# Patient Record
Sex: FEMALE | Race: WHITE | NOT HISPANIC OR LATINO | Employment: OTHER | URBAN - METROPOLITAN AREA
[De-identification: names, ages, dates, MRNs, and addresses within clinical notes are randomized per-mention and may not be internally consistent; named-entity substitution may affect disease eponyms.]

---

## 2017-03-25 ENCOUNTER — HOSPITAL ENCOUNTER (OUTPATIENT)
Facility: MEDICAL CENTER | Age: 62
End: 2017-03-25
Attending: NURSE PRACTITIONER
Payer: OTHER GOVERNMENT

## 2017-03-25 ENCOUNTER — OFFICE VISIT (OUTPATIENT)
Dept: URGENT CARE | Facility: PHYSICIAN GROUP | Age: 62
End: 2017-03-25
Payer: OTHER GOVERNMENT

## 2017-03-25 VITALS
TEMPERATURE: 97.5 F | SYSTOLIC BLOOD PRESSURE: 132 MMHG | RESPIRATION RATE: 16 BRPM | HEIGHT: 65 IN | OXYGEN SATURATION: 96 % | DIASTOLIC BLOOD PRESSURE: 80 MMHG | BODY MASS INDEX: 28.32 KG/M2 | HEART RATE: 96 BPM | WEIGHT: 170 LBS

## 2017-03-25 DIAGNOSIS — R30.0 DYSURIA: ICD-10-CM

## 2017-03-25 LAB
APPEARANCE UR: CLEAR
BILIRUB UR STRIP-MCNC: NORMAL MG/DL
COLOR UR AUTO: YELLOW
GLUCOSE UR STRIP.AUTO-MCNC: NORMAL MG/DL
KETONES UR STRIP.AUTO-MCNC: NORMAL MG/DL
LEUKOCYTE ESTERASE UR QL STRIP.AUTO: NORMAL
NITRITE UR QL STRIP.AUTO: NORMAL
PH UR STRIP.AUTO: 6 [PH] (ref 5–8)
PROT UR QL STRIP: NORMAL MG/DL
RBC UR QL AUTO: NORMAL
SP GR UR STRIP.AUTO: 1
UROBILINOGEN UR STRIP-MCNC: NORMAL MG/DL

## 2017-03-25 PROCEDURE — 81002 URINALYSIS NONAUTO W/O SCOPE: CPT | Performed by: NURSE PRACTITIONER

## 2017-03-25 PROCEDURE — 87086 URINE CULTURE/COLONY COUNT: CPT

## 2017-03-25 PROCEDURE — 87077 CULTURE AEROBIC IDENTIFY: CPT

## 2017-03-25 PROCEDURE — 99203 OFFICE O/P NEW LOW 30 MIN: CPT | Performed by: NURSE PRACTITIONER

## 2017-03-25 PROCEDURE — 87186 SC STD MICRODIL/AGAR DIL: CPT

## 2017-03-25 RX ORDER — MECOBALAMIN 5000 MCG
1 TABLET,DISINTEGRATING ORAL EVERY MORNING
COMMUNITY

## 2017-03-25 RX ORDER — PHENAZOPYRIDINE HYDROCHLORIDE 100 MG/1
200 TABLET, FILM COATED ORAL 3 TIMES DAILY PRN
Qty: 6 TAB | Refills: 0 | Status: SHIPPED | OUTPATIENT
Start: 2017-03-25 | End: 2017-10-25

## 2017-03-25 ASSESSMENT — LIFESTYLE VARIABLES: SUBSTANCE_ABUSE: 0

## 2017-03-25 ASSESSMENT — ENCOUNTER SYMPTOMS
NAUSEA: 0
DIZZINESS: 0
BACK PAIN: 0
CHILLS: 0
FLANK PAIN: 0
FEVER: 0
ABDOMINAL PAIN: 0

## 2017-03-25 NOTE — PROGRESS NOTES
"Subjective:      Hilda Emanuel is a 61 y.o. female who presents with Dysuria  .  [facility] reviewed and updated as necessary in EPIC electronic record with patient today.  Medications including OTC medications reviewed with patient.         Allergies   Allergen Reactions   • Septra [Bactrim] Rash   • Sulfa Drugs Rash     NAUSEA AND VOMITING             HPI march 8 was put on Macrodantin for UTI. Symptoms completely improved after macrodantan. Yesterday she also had burning and urgency with urination and 'did not feel good\" this morning. Now here at urgent care not having all the symptoms of burning or urgency.  She has history of UTI with sepsis ( approx 2013 at [facility]). Denies history of pyelonephritis.     Review of Systems   Constitutional: Negative for fever and chills.   Gastrointestinal: Negative for nausea and abdominal pain.   Genitourinary: Positive for urgency and frequency. Negative for dysuria, hematuria and flank pain.   Musculoskeletal: Negative for back pain.   Neurological: Negative for dizziness.   Endo/Heme/Allergies: Negative for environmental allergies.   Psychiatric/Behavioral: Negative for substance abuse.          Objective:     /80 mmHg  Pulse 96  Temp(Src) 36.4 °C (97.5 °F)  Resp 16  Ht 1.651 m (5' 5\")  Wt 77.111 kg (170 lb)  BMI 28.29 kg/m2  SpO2 96%     Physical Exam   Constitutional: She is oriented to person, place, and time. She appears well-developed and well-nourished.   HENT:   Head: Normocephalic.   Neck: Normal range of motion.   Cardiovascular: Normal rate.    Pulmonary/Chest: Effort normal.   Abdominal: Soft. Normal appearance. There is no tenderness. There is no rigidity, no rebound, no guarding and no CVA tenderness.   Neurological: She is alert and oriented to person, place, and time.   Skin: Skin is warm and dry.   Nursing note and vitals reviewed.    UA : trace leuk, trace blood.otherwise negative - see results in chart          Assessment/Plan:     1. " Dysuria  POCT Urinalysis    phenazopyridine (PYRIDIUM) 100 MG Tab    URINE CULTURE(NEW)   Differential Diagnosis includes but is not limited to: cystitis - interstitial, UTI    Educated in proper administration of medication(s) ordered today including safety, possible SE, risks, benefits, rationale and alternatives to therapy.   Return to clinic or PCP prn  if current symptoms are not resolving in a satisfactory manner or sooner if new or worsening symptoms occur.   Patient and or family advised differential diagnoses, signs and symptoms which would warrant Emergency Department evaluation.  Verbalizes understanding of instructions.   Pt education done. Aftercare instructions given to pt/ caregiver. Questions answered. Verbalizes good understanding.   Keep well hydrated      [phone] okay to leave message- they will be traveling in [place] and [place] area for several weeks.

## 2017-03-27 LAB
BACTERIA UR CULT: ABNORMAL
BACTERIA UR CULT: ABNORMAL
SIGNIFICANT IND 70042: ABNORMAL
SOURCE SOURCE: ABNORMAL

## 2017-03-30 ENCOUNTER — TELEPHONE (OUTPATIENT)
Dept: URGENT CARE | Facility: CLINIC | Age: 62
End: 2017-03-30

## 2017-03-30 DIAGNOSIS — N30.00 ACUTE CYSTITIS WITHOUT HEMATURIA: ICD-10-CM

## 2017-03-30 RX ORDER — PHENAZOPYRIDINE HYDROCHLORIDE 200 MG/1
200 TABLET, FILM COATED ORAL 3 TIMES DAILY PRN
Qty: 6 TAB | Refills: 0 | Status: SHIPPED | OUTPATIENT
Start: 2017-03-30 | End: 2017-10-25

## 2017-03-30 RX ORDER — CIPROFLOXACIN 500 MG/1
500 TABLET, FILM COATED ORAL 2 TIMES DAILY
Qty: 10 TAB | Refills: 0 | Status: SHIPPED | OUTPATIENT
Start: 2017-03-30 | End: 2017-04-04

## 2017-10-25 DIAGNOSIS — Z01.812 PRE-OPERATIVE LABORATORY EXAMINATION: ICD-10-CM

## 2017-10-25 LAB
ANION GAP SERPL CALC-SCNC: 10 MMOL/L (ref 0–11.9)
BASOPHILS # BLD AUTO: 0.5 % (ref 0–1.8)
BASOPHILS # BLD: 0.05 K/UL (ref 0–0.12)
BUN SERPL-MCNC: 27 MG/DL (ref 8–22)
CALCIUM SERPL-MCNC: 10.4 MG/DL (ref 8.5–10.5)
CHLORIDE SERPL-SCNC: 105 MMOL/L (ref 96–112)
CO2 SERPL-SCNC: 23 MMOL/L (ref 20–33)
CREAT SERPL-MCNC: 1.2 MG/DL (ref 0.5–1.4)
EOSINOPHIL # BLD AUTO: 0.14 K/UL (ref 0–0.51)
EOSINOPHIL NFR BLD: 1.5 % (ref 0–6.9)
ERYTHROCYTE [DISTWIDTH] IN BLOOD BY AUTOMATED COUNT: 43.9 FL (ref 35.9–50)
GFR SERPL CREATININE-BSD FRML MDRD: 45 ML/MIN/1.73 M 2
GLUCOSE SERPL-MCNC: 86 MG/DL (ref 65–99)
HCT VFR BLD AUTO: 42.4 % (ref 37–47)
HGB BLD-MCNC: 13.9 G/DL (ref 12–16)
IMM GRANULOCYTES # BLD AUTO: 0.03 K/UL (ref 0–0.11)
IMM GRANULOCYTES NFR BLD AUTO: 0.3 % (ref 0–0.9)
LYMPHOCYTES # BLD AUTO: 2.95 K/UL (ref 1–4.8)
LYMPHOCYTES NFR BLD: 31.3 % (ref 22–41)
MCH RBC QN AUTO: 29.4 PG (ref 27–33)
MCHC RBC AUTO-ENTMCNC: 32.8 G/DL (ref 33.6–35)
MCV RBC AUTO: 89.8 FL (ref 81.4–97.8)
MONOCYTES # BLD AUTO: 0.72 K/UL (ref 0–0.85)
MONOCYTES NFR BLD AUTO: 7.7 % (ref 0–13.4)
NEUTROPHILS # BLD AUTO: 5.52 K/UL (ref 2–7.15)
NEUTROPHILS NFR BLD: 58.7 % (ref 44–72)
NRBC # BLD AUTO: 0 K/UL
NRBC BLD AUTO-RTO: 0 /100 WBC
PLATELET # BLD AUTO: 194 K/UL (ref 164–446)
PMV BLD AUTO: 11.5 FL (ref 9–12.9)
POTASSIUM SERPL-SCNC: 4.4 MMOL/L (ref 3.6–5.5)
RBC # BLD AUTO: 4.72 M/UL (ref 4.2–5.4)
SODIUM SERPL-SCNC: 138 MMOL/L (ref 135–145)
WBC # BLD AUTO: 9.4 K/UL (ref 4.8–10.8)

## 2017-10-25 PROCEDURE — 85025 COMPLETE CBC W/AUTO DIFF WBC: CPT

## 2017-10-25 PROCEDURE — 80048 BASIC METABOLIC PNL TOTAL CA: CPT

## 2017-10-25 PROCEDURE — 36415 COLL VENOUS BLD VENIPUNCTURE: CPT

## 2017-10-25 RX ORDER — FLUTICASONE PROPIONATE 50 MCG
1 SPRAY, SUSPENSION (ML) NASAL DAILY
COMMUNITY
End: 2021-01-26

## 2017-10-25 RX ORDER — LEVOTHYROXINE SODIUM 0.12 MG/1
125 TABLET ORAL
COMMUNITY

## 2017-11-01 ENCOUNTER — HOSPITAL ENCOUNTER (OUTPATIENT)
Facility: MEDICAL CENTER | Age: 62
End: 2017-11-01
Attending: OPHTHALMOLOGY | Admitting: OPHTHALMOLOGY
Payer: OTHER GOVERNMENT

## 2017-11-01 VITALS
BODY MASS INDEX: 35.1 KG/M2 | RESPIRATION RATE: 16 BRPM | HEIGHT: 60 IN | SYSTOLIC BLOOD PRESSURE: 146 MMHG | HEART RATE: 84 BPM | WEIGHT: 178.79 LBS | TEMPERATURE: 96.7 F | DIASTOLIC BLOOD PRESSURE: 86 MMHG | OXYGEN SATURATION: 94 %

## 2017-11-01 PROBLEM — H04.222 EPIPHORA DUE TO INSUFFICIENT DRAINAGE OF LEFT SIDE: Status: ACTIVE | Noted: 2017-11-01

## 2017-11-01 PROCEDURE — 160035 HCHG PACU - 1ST 60 MINS PHASE I: Performed by: OPHTHALMOLOGY

## 2017-11-01 PROCEDURE — 160048 HCHG OR STATISTICAL LEVEL 1-5: Performed by: OPHTHALMOLOGY

## 2017-11-01 PROCEDURE — 501423 HCHG SPONGE, SURGIFOAM 12X7: Performed by: OPHTHALMOLOGY

## 2017-11-01 PROCEDURE — A9270 NON-COVERED ITEM OR SERVICE: HCPCS

## 2017-11-01 PROCEDURE — 700102 HCHG RX REV CODE 250 W/ 637 OVERRIDE(OP)

## 2017-11-01 PROCEDURE — 700101 HCHG RX REV CODE 250

## 2017-11-01 PROCEDURE — 500558 HCHG EYE SHIELD W/GARTER (FOX): Performed by: OPHTHALMOLOGY

## 2017-11-01 PROCEDURE — 160002 HCHG RECOVERY MINUTES (STAT): Performed by: OPHTHALMOLOGY

## 2017-11-01 PROCEDURE — 501326 HCHG SET, CANALIARLUS INTUBATION-EYE: Performed by: OPHTHALMOLOGY

## 2017-11-01 PROCEDURE — 700111 HCHG RX REV CODE 636 W/ 250 OVERRIDE (IP)

## 2017-11-01 PROCEDURE — 160009 HCHG ANES TIME/MIN: Performed by: OPHTHALMOLOGY

## 2017-11-01 PROCEDURE — 160036 HCHG PACU - EA ADDL 30 MINS PHASE I: Performed by: OPHTHALMOLOGY

## 2017-11-01 PROCEDURE — 160029 HCHG SURGERY MINUTES - 1ST 30 MINS LEVEL 4: Performed by: OPHTHALMOLOGY

## 2017-11-01 PROCEDURE — 500331 HCHG COTTONOID, SURG PATTIE: Performed by: OPHTHALMOLOGY

## 2017-11-01 PROCEDURE — 160041 HCHG SURGERY MINUTES - EA ADDL 1 MIN LEVEL 4: Performed by: OPHTHALMOLOGY

## 2017-11-01 RX ORDER — TRIAMCINOLONE ACETONIDE 40 MG/ML
INJECTION, SUSPENSION INTRA-ARTICULAR; INTRAMUSCULAR
Status: DISCONTINUED | OUTPATIENT
Start: 2017-11-01 | End: 2017-11-01 | Stop reason: HOSPADM

## 2017-11-01 RX ORDER — PHENYLEPHRINE HYDROCHLORIDE 25 MG/ML
SOLUTION/ DROPS OPHTHALMIC
Status: DISCONTINUED
Start: 2017-11-01 | End: 2017-11-01 | Stop reason: HOSPADM

## 2017-11-01 RX ORDER — LIDOCAINE HYDROCHLORIDE AND EPINEPHRINE BITARTRATE 20; .01 MG/ML; MG/ML
INJECTION, SOLUTION SUBCUTANEOUS
Status: DISCONTINUED | OUTPATIENT
Start: 2017-11-01 | End: 2017-11-01 | Stop reason: HOSPADM

## 2017-11-01 RX ORDER — LIDOCAINE HYDROCHLORIDE 10 MG/ML
0.5 INJECTION, SOLUTION INFILTRATION; PERINEURAL
Status: DISCONTINUED | OUTPATIENT
Start: 2017-11-01 | End: 2017-11-01 | Stop reason: HOSPADM

## 2017-11-01 RX ORDER — NEOMYCIN SULFATE, POLYMYXIN B SULFATE, AND DEXAMETHASONE 3.5; 10000; 1 MG/G; [USP'U]/G; MG/G
OINTMENT OPHTHALMIC
Status: DISCONTINUED
Start: 2017-11-01 | End: 2017-11-01 | Stop reason: HOSPADM

## 2017-11-01 RX ORDER — SODIUM CHLORIDE, SODIUM LACTATE, POTASSIUM CHLORIDE, CALCIUM CHLORIDE 600; 310; 30; 20 MG/100ML; MG/100ML; MG/100ML; MG/100ML
INJECTION, SOLUTION INTRAVENOUS CONTINUOUS
Status: DISCONTINUED | OUTPATIENT
Start: 2017-11-01 | End: 2017-11-01 | Stop reason: HOSPADM

## 2017-11-01 RX ORDER — LIDOCAINE HYDROCHLORIDE AND EPINEPHRINE BITARTRATE 20; .01 MG/ML; MG/ML
INJECTION, SOLUTION SUBCUTANEOUS
Status: DISCONTINUED
Start: 2017-11-01 | End: 2017-11-01 | Stop reason: HOSPADM

## 2017-11-01 RX ORDER — TETRACAINE HYDROCHLORIDE 5 MG/ML
SOLUTION OPHTHALMIC
Status: DISCONTINUED
Start: 2017-11-01 | End: 2017-11-01 | Stop reason: HOSPADM

## 2017-11-01 RX ORDER — TRIAMCINOLONE ACETONIDE 40 MG/ML
INJECTION, SUSPENSION INTRA-ARTICULAR; INTRAMUSCULAR
Status: DISCONTINUED
Start: 2017-11-01 | End: 2017-11-01 | Stop reason: HOSPADM

## 2017-11-01 RX ORDER — ACETAMINOPHEN 325 MG/1
TABLET ORAL
Status: COMPLETED
Start: 2017-11-01 | End: 2017-11-01

## 2017-11-01 RX ORDER — LIDOCAINE AND PRILOCAINE 25; 25 MG/G; MG/G
1 CREAM TOPICAL
Status: DISCONTINUED | OUTPATIENT
Start: 2017-11-01 | End: 2017-11-01 | Stop reason: HOSPADM

## 2017-11-01 RX ORDER — OXYMETAZOLINE HYDROCHLORIDE 0.05 G/100ML
SPRAY NASAL
Status: DISCONTINUED
Start: 2017-11-01 | End: 2017-11-01 | Stop reason: HOSPADM

## 2017-11-01 RX ADMIN — ACETAMINOPHEN 650 MG: 325 TABLET, FILM COATED ORAL at 09:40

## 2017-11-01 RX ADMIN — SODIUM CHLORIDE, SODIUM LACTATE, POTASSIUM CHLORIDE, CALCIUM CHLORIDE 1000 ML: 600; 310; 30; 20 INJECTION, SOLUTION INTRAVENOUS at 06:45

## 2017-11-01 ASSESSMENT — PAIN SCALES - GENERAL
PAINLEVEL_OUTOF10: 2
PAINLEVEL_OUTOF10: 0
PAINLEVEL_OUTOF10: 0
PAINLEVEL_OUTOF10: 1
PAINLEVEL_OUTOF10: 2
PAINLEVEL_OUTOF10: 1

## 2017-11-01 NOTE — DISCHARGE INSTRUCTIONS
ACTIVITY: Rest and take it easy for the first 24 hours.  A responsible adult is recommended to remain with you during that time.  It is normal to feel sleepy.  We encourage you to not do anything that requires balance, judgment or coordination.    MILD FLU-LIKE SYMPTOMS ARE NORMAL. YOU MAY EXPERIENCE GENERALIZED MUSCLE ACHES, THROAT IRRITATION, HEADACHE AND/OR SOME NAUSEA.    FOR 24 HOURS DO NOT:  Drive, operate machinery or run household appliances.  Drink beer or alcoholic beverages.   Make important decisions or sign legal documents.    SPECIAL INSTRUCTIONS: Head of bed elevated for 48 hours; Eye shield to operative eye every bedtime; No bending, straining, standing, stooping or lifting; No nose blowing. Ointment 3 times daily to left eye. Over the counter nasal saline spray to left nose three times daily; over the counter Afrin to left side of nose three times daily; Flonase to left side of nose three times daily; stop Afrin after 2 days continue nasal saline and flonase.     DIET: To avoid nausea, slowly advance diet as tolerated, avoiding spicy or greasy foods for the first day.  Add more substantial food to your diet according to your physician's instructions.  Babies can be fed formula or breast milk as soon as they are hungry.  INCREASE FLUIDS AND FIBER TO AVOID CONSTIPATION.    SURGICAL DRESSING/BATHING: May shower starting tomorrow    FOLLOW-UP APPOINTMENT:  A follow-up appointment should be arranged with your doctor in follow up as scheduled in 1 week; call to schedule.    You should CALL YOUR PHYSICIAN if you develop:  Fever greater than 101 degrees F.  Pain not relieved by medication, or persistent nausea or vomiting.  Excessive bleeding (blood soaking through dressing) or unexpected drainage from the wound.  Extreme redness or swelling around the incision site, drainage of pus or foul smelling drainage.  Inability to urinate or empty your bladder within 8 hours.  Problems with breathing or chest  pain.    You should call 911 if you develop problems with breathing or chest pain.  If you are unable to contact your doctor or surgical center, you should go to the nearest emergency room or urgent care center.  Physician's telephone #: [phone]    If any questions arise, call your doctor.  If your doctor is not available, please feel free to call the Surgical Center at [phone].  The Center is open Monday through Friday from 7AM to 7PM.  You can also call the HEALTH HOTLINE open 24 hours/day, 7 days/week and speak to a nurse at [phone], or toll free at [phone].    A registered nurse may call you a few days after your surgery to see how you are doing after your procedure.    MEDICATIONS: Resume taking daily medication.  Take prescribed pain medication with food.  If no medication is prescribed, you may take non-aspirin pain medication if needed.  PAIN MEDICATION CAN BE VERY CONSTIPATING.  Take a stool softener or laxative such as senokot, pericolace, or milk of magnesia if needed.    Prescription given for none.  Last pain medication given at 9:40 am .    If your physician has prescribed pain medication that includes Acetaminophen (Tylenol), do not take additional Acetaminophen (Tylenol) while taking the prescribed medication.    Depression / Suicide Risk    As you are discharged from this [facility] facility, it is important to learn how to keep safe from harming yourself.    Recognize the warning signs:  · Abrupt changes in personality, positive or negative- including increase in energy   · Giving away possessions  · Change in eating patterns- significant weight changes-  positive or negative  · Change in sleeping patterns- unable to sleep or sleeping all the time   · Unwillingness or inability to communicate  · Depression  · Unusual sadness, discouragement and loneliness  · Talk of wanting to die  · Neglect of personal appearance   · Rebelliousness- reckless behavior  · Withdrawal from  people/activities they love  · Confusion- inability to concentrate     If you or a loved one observes any of these behaviors or has concerns about self-harm, here's what you can do:  · Talk about it- your feelings and reasons for harming yourself  · Remove any means that you might use to hurt yourself (examples: pills, rope, extension cords, firearm)  · Get professional help from the community (Mental Health, Substance Abuse, psychological counseling)  · Do not be alone:Call your Safe Contact- someone whom you trust who will be there for you.  · Call your local CRISIS HOTLINE 361-5958 or 456-619-6248  · Call your local Children's Mobile Crisis Response Team [place] (796) 350-0936 or www.[employer]  · Call the toll free National Suicide Prevention Hotlines   · National Suicide Prevention Lifeline 507-087-EAVS (7114)  · National Hope Line Network 800-SUICIDE (884-0158)

## 2017-11-01 NOTE — OP REPORT
DATE OF OPERATION:  11/1/2017    ATTENDING SURGEON:  Rob Burleson MD.    ASSISTANT SURGEON:  None.    ANESTHESIA:  General.    ANESTHESIOLOGIST:  Sen Alan MD.    PREOPERATIVE DIAGNOSES:  1.  Epiphora, left eye.  2.  Acquired nasolacrimal duct obstruction, left eye.    POSTOPERATIVE DIAGNOSES:  1.  Epiphora, left eye.  2.  Acquired nasolacrimal duct obstruction, left eye.    PROCEDURES PERFORMED:  1.  Endoscopic dacryocystorhinostomy, left.  2.  Nasolacrimal stent placement, left.    SPECIMENS:  None.    IMPLANTS:  C-line stent to left nasolacrimal system.    COMPLICATIONS:  None.    ESTIMATED BLOOD LOSS:  Less than 10 mL.    INDICATIONS FOR PROCEDURE:  This is a 62 y.o. female with a history of   epiphora of the left eye, who on examination was noted to have   an elevated tear lake and upon irrigation testing, had findings that were   consistent with nasolacrimal duct obstruction.  As a result, it was   recommended to the patient to undergo the above-listed procedures.  The risks,   benefits, and alternatives were explained to the patient in detail and   informed consent was signed.    PROCEDURE IN DETAIL:  The patient was brought to the operating room, laid   supine on the operating room table.  After the induction of general   anesthesia, the left nose was then packed with 4% cocaine-soaked cotton   pledgets in the region of the middle turbinate and middle meatus.  The right eye was taped and shielded.  The left periocular area and   the entire nose was then prepped and draped in the usual sterile fashion.  The   packing material was then removed from the nose, and 0-degree endoscope was   then used to view the nasal anatomy.  The lateral nasal mucosa starting at the root of the middle turbinate and then overlying the nasolacrimal sac in the maxillary line   was infiltrated with 2% lidocaine with epinephrine.  The middle turbinate was   also infiltrated with 2% lidocaine with epinephrine.  The nose was then    repacked with Afrin-soaked cotton pledgets.  After several minutes, the   packing was then removed and a #66 blade was then used to incise the nasal   mucosa overlying the maxillary line.  Back cuts were made anteriorly to create   a mucosal flap, which was then elevated using a [place] elevator.  The   mucosal flap was then excised with a 90-degree Kerrison punch.  Additional   exposure posteriorly was then performed over the bone using a [place] elevator.    A 90-degree hard Kerrison punch was then used to enter through the lacrimal   bone to the start of the bony ostomy.  The bony ostomy was then enlarged both superiorly and inferiorly to expose the lacrimal sac in its entirety all the way up to its fundus.   A 30-degree scope was used during the creation of the ostomy to assist with exposure and visualization.      A punctal dilator was then used to dilate the upper and lower eyelid puncta and a 00 Mckeon probe was then placed through the upper system and noted to tent the sac.   The sac was then opened along its vertical length using a #66 blade.  The mucosal   flaps were then excised using a 90-degree Kerrison punch and a front-biting   Joao-Cut sinus instrument.  Care was then taken to make sure that the bony   ostomy was extended superiorly enough to expose the exit of the common   canaliculus on the lateral aspect of the sac.  This was confirmed with   placement of the Mckeon probe.  Once we were satisfied with the appearance of   the ostomy, a C-line stent was then opened and introduced via the superior   canalicular system and into the ostomy site.  This was then retrieved out   through the nose.  Similarly, the other arm was then placed through the lower   system and out through the ostomy.  This was left untied for the moment.  The   ostomy was then once again inspected and noted to be satisfactory.  At this   point, the stent was then tied to itself using 3 single-throw knots at the level of the naris.   The knot was then secured with a 6-0 Prolene suture.  The Prolene suture was then passed through the nasal vestibule on its lateral surface superior into the hair bearing area with that same suture.  A small pledget of Gelfoam soaked in Kenalog 40 was then placed at the ostomy.  Maxitrol ointment was then placed in the medial canthus at the   conclusion of the case.  The patient was then extubated and taken to the PACU   in stable condition.  There were no complications

## 2017-11-01 NOTE — OR NURSING
0901 Pt arrived from OR with Dr. Alan.  Pt VSS, PIV in place, infusing without issue.  Left eye, with small amount of ointment and clear drainage in place.  Pt still very sleepy.  0915 Pt denies pain/nausea, tolerating sips of water well. Warm blankets provided.   0940 Pt complaints of soreness in her eye, asking for Tylenol only.  Call placed to Dr. Alan, per MD order for 650 mg of Tylenol.    1025 Pt up to change clothes at bedside, tolerated well.   1035 Discharge instructions given to patient and .  Answered all questions and concerns.    1045 PIV removed, pt tolerated well.  Pt meets discharge criteria and feels ready to go home home.    1048 Pt left with  to d/c home.

## 2018-05-08 ENCOUNTER — HOSPITAL ENCOUNTER (OUTPATIENT)
Dept: LAB | Facility: MEDICAL CENTER | Age: 63
End: 2018-05-08
Attending: OBSTETRICS & GYNECOLOGY
Payer: OTHER GOVERNMENT

## 2018-05-08 LAB — CYTOLOGY REG CYTOL: NORMAL

## 2018-05-08 PROCEDURE — 88175 CYTOPATH C/V AUTO FLUID REDO: CPT

## 2019-05-23 ENCOUNTER — HOSPITAL ENCOUNTER (OUTPATIENT)
Dept: LAB | Facility: MEDICAL CENTER | Age: 64
End: 2019-05-23
Attending: OBSTETRICS & GYNECOLOGY
Payer: MEDICARE

## 2019-05-23 PROCEDURE — 87624 HPV HI-RISK TYP POOLED RSLT: CPT

## 2019-05-23 PROCEDURE — 88175 CYTOPATH C/V AUTO FLUID REDO: CPT

## 2019-05-24 LAB
CYTOLOGY REG CYTOL: NORMAL
HPV HR 12 DNA CVX QL NAA+PROBE: NEGATIVE
HPV16 DNA SPEC QL NAA+PROBE: NEGATIVE
HPV18 DNA SPEC QL NAA+PROBE: NEGATIVE
SPECIMEN SOURCE: NORMAL

## 2021-01-26 ENCOUNTER — PRE-ADMISSION TESTING (OUTPATIENT)
Dept: ADMISSIONS | Facility: MEDICAL CENTER | Age: 66
End: 2021-01-26
Attending: UROLOGY
Payer: MEDICARE

## 2021-01-26 DIAGNOSIS — Z01.812 PRE-OPERATIVE LABORATORY EXAMINATION: ICD-10-CM

## 2021-01-26 DIAGNOSIS — Z01.810 PRE-OPERATIVE CARDIOVASCULAR EXAMINATION: ICD-10-CM

## 2021-01-26 LAB
ABO GROUP BLD: NORMAL
ANION GAP SERPL CALC-SCNC: 12 MMOL/L (ref 7–16)
APPEARANCE UR: CLEAR
BACTERIA #/AREA URNS HPF: NEGATIVE /HPF
BASOPHILS # BLD AUTO: 0.3 % (ref 0–1.8)
BASOPHILS # BLD: 0.03 K/UL (ref 0–0.12)
BILIRUB UR QL STRIP.AUTO: NEGATIVE
BLD GP AB SCN SERPL QL: NORMAL
BUN SERPL-MCNC: 24 MG/DL (ref 8–22)
CALCIUM SERPL-MCNC: 10.5 MG/DL (ref 8.5–10.5)
CHLORIDE SERPL-SCNC: 103 MMOL/L (ref 96–112)
CO2 SERPL-SCNC: 22 MMOL/L (ref 20–33)
COLOR UR: YELLOW
CREAT SERPL-MCNC: 1.29 MG/DL (ref 0.5–1.4)
EKG IMPRESSION: NORMAL
EOSINOPHIL # BLD AUTO: 0.06 K/UL (ref 0–0.51)
EOSINOPHIL NFR BLD: 0.6 % (ref 0–6.9)
EPI CELLS #/AREA URNS HPF: NEGATIVE /HPF
ERYTHROCYTE [DISTWIDTH] IN BLOOD BY AUTOMATED COUNT: 44.9 FL (ref 35.9–50)
GLUCOSE SERPL-MCNC: 95 MG/DL (ref 65–99)
GLUCOSE UR STRIP.AUTO-MCNC: NEGATIVE MG/DL
HCT VFR BLD AUTO: 41.4 % (ref 37–47)
HGB BLD-MCNC: 13.5 G/DL (ref 12–16)
HYALINE CASTS #/AREA URNS LPF: NORMAL /LPF
IMM GRANULOCYTES # BLD AUTO: 0.03 K/UL (ref 0–0.11)
IMM GRANULOCYTES NFR BLD AUTO: 0.3 % (ref 0–0.9)
INR PPP: 0.95 (ref 0.87–1.13)
KETONES UR STRIP.AUTO-MCNC: NEGATIVE MG/DL
LEUKOCYTE ESTERASE UR QL STRIP.AUTO: NEGATIVE
LYMPHOCYTES # BLD AUTO: 3.96 K/UL (ref 1–4.8)
LYMPHOCYTES NFR BLD: 39 % (ref 22–41)
MCH RBC QN AUTO: 29.9 PG (ref 27–33)
MCHC RBC AUTO-ENTMCNC: 32.6 G/DL (ref 33.6–35)
MCV RBC AUTO: 91.8 FL (ref 81.4–97.8)
MICRO URNS: ABNORMAL
MONOCYTES # BLD AUTO: 0.98 K/UL (ref 0–0.85)
MONOCYTES NFR BLD AUTO: 9.7 % (ref 0–13.4)
NEUTROPHILS # BLD AUTO: 5.09 K/UL (ref 2–7.15)
NEUTROPHILS NFR BLD: 50.1 % (ref 44–72)
NITRITE UR QL STRIP.AUTO: NEGATIVE
NRBC # BLD AUTO: 0 K/UL
NRBC BLD-RTO: 0 /100 WBC
PH UR STRIP.AUTO: 6 [PH] (ref 5–8)
PLATELET # BLD AUTO: 172 K/UL (ref 164–446)
PMV BLD AUTO: 11 FL (ref 9–12.9)
POTASSIUM SERPL-SCNC: 4.2 MMOL/L (ref 3.6–5.5)
PROT UR QL STRIP: 30 MG/DL
PROTHROMBIN TIME: 13 SEC (ref 12–14.6)
RBC # BLD AUTO: 4.51 M/UL (ref 4.2–5.4)
RBC # URNS HPF: NORMAL /HPF
RBC UR QL AUTO: NEGATIVE
RH BLD: NORMAL
SODIUM SERPL-SCNC: 137 MMOL/L (ref 135–145)
SP GR UR STRIP.AUTO: 1.01
UROBILINOGEN UR STRIP.AUTO-MCNC: 0.2 MG/DL
WBC # BLD AUTO: 10.2 K/UL (ref 4.8–10.8)
WBC #/AREA URNS HPF: NORMAL /HPF

## 2021-01-26 PROCEDURE — 86850 RBC ANTIBODY SCREEN: CPT

## 2021-01-26 PROCEDURE — 86901 BLOOD TYPING SEROLOGIC RH(D): CPT

## 2021-01-26 PROCEDURE — 81001 URINALYSIS AUTO W/SCOPE: CPT

## 2021-01-26 PROCEDURE — 85025 COMPLETE CBC W/AUTO DIFF WBC: CPT

## 2021-01-26 PROCEDURE — 93010 ELECTROCARDIOGRAM REPORT: CPT | Performed by: INTERNAL MEDICINE

## 2021-01-26 PROCEDURE — 93005 ELECTROCARDIOGRAM TRACING: CPT

## 2021-01-26 PROCEDURE — 36415 COLL VENOUS BLD VENIPUNCTURE: CPT

## 2021-01-26 PROCEDURE — 85610 PROTHROMBIN TIME: CPT

## 2021-01-26 PROCEDURE — 86900 BLOOD TYPING SEROLOGIC ABO: CPT

## 2021-01-26 PROCEDURE — 80048 BASIC METABOLIC PNL TOTAL CA: CPT

## 2021-01-26 RX ORDER — ACETAMINOPHEN 500 MG
500-1000 TABLET ORAL EVERY 6 HOURS PRN
COMMUNITY

## 2021-02-08 ENCOUNTER — PRE-ADMISSION TESTING (OUTPATIENT)
Dept: ADMISSIONS | Facility: MEDICAL CENTER | Age: 66
End: 2021-02-08
Attending: UROLOGY
Payer: MEDICARE

## 2021-02-08 DIAGNOSIS — Z01.812 PRE-OPERATIVE LABORATORY EXAMINATION: ICD-10-CM

## 2021-02-08 LAB — COVID ORDER STATUS COVID19: NORMAL

## 2021-02-08 PROCEDURE — U0005 INFEC AGEN DETEC AMPLI PROBE: HCPCS

## 2021-02-08 PROCEDURE — C9803 HOPD COVID-19 SPEC COLLECT: HCPCS

## 2021-02-08 PROCEDURE — U0003 INFECTIOUS AGENT DETECTION BY NUCLEIC ACID (DNA OR RNA); SEVERE ACUTE RESPIRATORY SYNDROME CORONAVIRUS 2 (SARS-COV-2) (CORONAVIRUS DISEASE [COVID-19]), AMPLIFIED PROBE TECHNIQUE, MAKING USE OF HIGH THROUGHPUT TECHNOLOGIES AS DESCRIBED BY CMS-2020-01-R: HCPCS

## 2021-02-09 LAB
SARS-COV-2 RNA RESP QL NAA+PROBE: NOTDETECTED
SPECIMEN SOURCE: NORMAL

## 2021-02-10 NOTE — PROGRESS NOTES
COVID-19 Pre-surgery screenin. Do you have an undiagnosed respiratory illness or symptoms such as coughing or sneezing  NO      2. Do you have an unexplained fever greater than 100.4 degrees Fahrenheit or 38 degrees Celsius?     no    3. Have you had direct exposure to a patient who tested positive for Covid-19?    no    4. Have you had any loss of your sense of taste or smell? Have you had N/V or sore throat? no    Patient has been informed of visitor policy and asked to wear a mask upon entering the hospital   no

## 2021-02-11 ENCOUNTER — HOSPITAL ENCOUNTER (OUTPATIENT)
Facility: MEDICAL CENTER | Age: 66
End: 2021-02-13
Attending: UROLOGY | Admitting: UROLOGY
Payer: MEDICARE

## 2021-02-11 ENCOUNTER — ANESTHESIA (OUTPATIENT)
Dept: SURGERY | Facility: MEDICAL CENTER | Age: 66
End: 2021-02-11
Payer: MEDICARE

## 2021-02-11 ENCOUNTER — ANESTHESIA EVENT (OUTPATIENT)
Dept: SURGERY | Facility: MEDICAL CENTER | Age: 66
End: 2021-02-11
Payer: MEDICARE

## 2021-02-11 DIAGNOSIS — N28.89 RENAL MASS, RIGHT: ICD-10-CM

## 2021-02-11 LAB — PATHOLOGY CONSULT NOTE: NORMAL

## 2021-02-11 PROCEDURE — 700101 HCHG RX REV CODE 250: Performed by: ANESTHESIOLOGY

## 2021-02-11 PROCEDURE — 700105 HCHG RX REV CODE 258: Performed by: ANESTHESIOLOGY

## 2021-02-11 PROCEDURE — 700101 HCHG RX REV CODE 250: Performed by: UROLOGY

## 2021-02-11 PROCEDURE — 160002 HCHG RECOVERY MINUTES (STAT): Performed by: UROLOGY

## 2021-02-11 PROCEDURE — A6402 STERILE GAUZE <= 16 SQ IN: HCPCS | Performed by: UROLOGY

## 2021-02-11 PROCEDURE — 700111 HCHG RX REV CODE 636 W/ 250 OVERRIDE (IP): Performed by: UROLOGY

## 2021-02-11 PROCEDURE — 96365 THER/PROPH/DIAG IV INF INIT: CPT | Mod: XU

## 2021-02-11 PROCEDURE — A9270 NON-COVERED ITEM OR SERVICE: HCPCS | Performed by: ANESTHESIOLOGY

## 2021-02-11 PROCEDURE — 500376 HCHG DRAIN, J-P ROUND 15FR: Performed by: UROLOGY

## 2021-02-11 PROCEDURE — 501576 HCHG TROCAR, SMTH CAN&SEAL12: Performed by: UROLOGY

## 2021-02-11 PROCEDURE — 160031 HCHG SURGERY MINUTES - 1ST 30 MINS LEVEL 5: Performed by: UROLOGY

## 2021-02-11 PROCEDURE — 501338 HCHG SHEARS, ENDO: Performed by: UROLOGY

## 2021-02-11 PROCEDURE — 501838 HCHG SUTURE GENERAL: Performed by: UROLOGY

## 2021-02-11 PROCEDURE — 700111 HCHG RX REV CODE 636 W/ 250 OVERRIDE (IP): Performed by: ANESTHESIOLOGY

## 2021-02-11 PROCEDURE — 502240 HCHG MISC OR SUPPLY RC 0272: Performed by: UROLOGY

## 2021-02-11 PROCEDURE — 160042 HCHG SURGERY MINUTES - EA ADDL 1 MIN LEVEL 5: Performed by: UROLOGY

## 2021-02-11 PROCEDURE — 160035 HCHG PACU - 1ST 60 MINS PHASE I: Performed by: UROLOGY

## 2021-02-11 PROCEDURE — 500868 HCHG NEEDLE, SURGI(VARES): Performed by: UROLOGY

## 2021-02-11 PROCEDURE — 160036 HCHG PACU - EA ADDL 30 MINS PHASE I: Performed by: UROLOGY

## 2021-02-11 PROCEDURE — A9270 NON-COVERED ITEM OR SERVICE: HCPCS | Performed by: PHYSICIAN ASSISTANT

## 2021-02-11 PROCEDURE — 88307 TISSUE EXAM BY PATHOLOGIST: CPT

## 2021-02-11 PROCEDURE — 700102 HCHG RX REV CODE 250 W/ 637 OVERRIDE(OP): Performed by: PHYSICIAN ASSISTANT

## 2021-02-11 PROCEDURE — 160048 HCHG OR STATISTICAL LEVEL 1-5: Performed by: UROLOGY

## 2021-02-11 PROCEDURE — 700105 HCHG RX REV CODE 258: Performed by: UROLOGY

## 2021-02-11 PROCEDURE — 502714 HCHG ROBOTIC SURGERY SERVICES: Performed by: UROLOGY

## 2021-02-11 PROCEDURE — 501570 HCHG TROCAR, SEPARATOR: Performed by: UROLOGY

## 2021-02-11 PROCEDURE — G0378 HOSPITAL OBSERVATION PER HR: HCPCS

## 2021-02-11 PROCEDURE — 700102 HCHG RX REV CODE 250 W/ 637 OVERRIDE(OP): Performed by: ANESTHESIOLOGY

## 2021-02-11 PROCEDURE — A9270 NON-COVERED ITEM OR SERVICE: HCPCS | Performed by: UROLOGY

## 2021-02-11 PROCEDURE — 501399 HCHG SPECIMAN BAG, ENDO CATC: Performed by: UROLOGY

## 2021-02-11 PROCEDURE — 500002 HCHG ADHESIVE, DERMABOND: Performed by: UROLOGY

## 2021-02-11 PROCEDURE — 501571 HCHG TROCAR, SEPARATOR 12X100: Performed by: UROLOGY

## 2021-02-11 PROCEDURE — 160009 HCHG ANES TIME/MIN: Performed by: UROLOGY

## 2021-02-11 RX ORDER — SODIUM CHLORIDE, SODIUM LACTATE, POTASSIUM CHLORIDE, CALCIUM CHLORIDE 600; 310; 30; 20 MG/100ML; MG/100ML; MG/100ML; MG/100ML
INJECTION, SOLUTION INTRAVENOUS
Status: DISCONTINUED | OUTPATIENT
Start: 2021-02-11 | End: 2021-02-11 | Stop reason: SURG

## 2021-02-11 RX ORDER — CEFAZOLIN SODIUM 1 G/3ML
INJECTION, POWDER, FOR SOLUTION INTRAMUSCULAR; INTRAVENOUS PRN
Status: DISCONTINUED | OUTPATIENT
Start: 2021-02-11 | End: 2021-02-11 | Stop reason: SURG

## 2021-02-11 RX ORDER — LABETALOL HYDROCHLORIDE 5 MG/ML
5 INJECTION, SOLUTION INTRAVENOUS
Status: DISCONTINUED | OUTPATIENT
Start: 2021-02-11 | End: 2021-02-11 | Stop reason: HOSPADM

## 2021-02-11 RX ORDER — OXYCODONE HCL 5 MG/5 ML
5 SOLUTION, ORAL ORAL
Status: COMPLETED | OUTPATIENT
Start: 2021-02-11 | End: 2021-02-11

## 2021-02-11 RX ORDER — DEXAMETHASONE SODIUM PHOSPHATE 4 MG/ML
4 INJECTION, SOLUTION INTRA-ARTICULAR; INTRALESIONAL; INTRAMUSCULAR; INTRAVENOUS; SOFT TISSUE
Status: DISCONTINUED | OUTPATIENT
Start: 2021-02-11 | End: 2021-02-13 | Stop reason: HOSPADM

## 2021-02-11 RX ORDER — HYDROMORPHONE HYDROCHLORIDE 1 MG/ML
0.1 INJECTION, SOLUTION INTRAMUSCULAR; INTRAVENOUS; SUBCUTANEOUS
Status: DISCONTINUED | OUTPATIENT
Start: 2021-02-11 | End: 2021-02-11 | Stop reason: HOSPADM

## 2021-02-11 RX ORDER — HYDROMORPHONE HYDROCHLORIDE 1 MG/ML
0.5 INJECTION, SOLUTION INTRAMUSCULAR; INTRAVENOUS; SUBCUTANEOUS
Status: DISCONTINUED | OUTPATIENT
Start: 2021-02-11 | End: 2021-02-13 | Stop reason: HOSPADM

## 2021-02-11 RX ORDER — DEXAMETHASONE SODIUM PHOSPHATE 4 MG/ML
INJECTION, SOLUTION INTRA-ARTICULAR; INTRALESIONAL; INTRAMUSCULAR; INTRAVENOUS; SOFT TISSUE PRN
Status: DISCONTINUED | OUTPATIENT
Start: 2021-02-11 | End: 2021-02-11 | Stop reason: SURG

## 2021-02-11 RX ORDER — GLYCOPYRROLATE 0.2 MG/ML
INJECTION INTRAMUSCULAR; INTRAVENOUS PRN
Status: DISCONTINUED | OUTPATIENT
Start: 2021-02-11 | End: 2021-02-11 | Stop reason: SURG

## 2021-02-11 RX ORDER — KETOROLAC TROMETHAMINE 30 MG/ML
INJECTION, SOLUTION INTRAMUSCULAR; INTRAVENOUS PRN
Status: DISCONTINUED | OUTPATIENT
Start: 2021-02-11 | End: 2021-02-11 | Stop reason: SURG

## 2021-02-11 RX ORDER — SODIUM CHLORIDE 9 MG/ML
INJECTION, SOLUTION INTRAVENOUS CONTINUOUS
Status: DISCONTINUED | OUTPATIENT
Start: 2021-02-11 | End: 2021-02-13

## 2021-02-11 RX ORDER — AMOXICILLIN 250 MG
2 CAPSULE ORAL
Status: DISCONTINUED | OUTPATIENT
Start: 2021-02-11 | End: 2021-02-13 | Stop reason: HOSPADM

## 2021-02-11 RX ORDER — ONDANSETRON 2 MG/ML
INJECTION INTRAMUSCULAR; INTRAVENOUS PRN
Status: DISCONTINUED | OUTPATIENT
Start: 2021-02-11 | End: 2021-02-11 | Stop reason: SURG

## 2021-02-11 RX ORDER — CELECOXIB 200 MG/1
200 CAPSULE ORAL ONCE
Status: COMPLETED | OUTPATIENT
Start: 2021-02-11 | End: 2021-02-11

## 2021-02-11 RX ORDER — DIPHENHYDRAMINE HYDROCHLORIDE 50 MG/ML
12.5 INJECTION INTRAMUSCULAR; INTRAVENOUS
Status: DISCONTINUED | OUTPATIENT
Start: 2021-02-11 | End: 2021-02-11 | Stop reason: HOSPADM

## 2021-02-11 RX ORDER — OXYCODONE HCL 5 MG/5 ML
10 SOLUTION, ORAL ORAL
Status: COMPLETED | OUTPATIENT
Start: 2021-02-11 | End: 2021-02-11

## 2021-02-11 RX ORDER — ACETAMINOPHEN 500 MG
TABLET ORAL
Status: DISPENSED
Start: 2021-02-11 | End: 2021-02-11

## 2021-02-11 RX ORDER — HALOPERIDOL 5 MG/ML
1 INJECTION INTRAMUSCULAR
Status: DISCONTINUED | OUTPATIENT
Start: 2021-02-11 | End: 2021-02-11 | Stop reason: HOSPADM

## 2021-02-11 RX ORDER — ONDANSETRON 2 MG/ML
4 INJECTION INTRAMUSCULAR; INTRAVENOUS
Status: COMPLETED | OUTPATIENT
Start: 2021-02-11 | End: 2021-02-11

## 2021-02-11 RX ORDER — DIPHENHYDRAMINE HYDROCHLORIDE 50 MG/ML
25 INJECTION INTRAMUSCULAR; INTRAVENOUS EVERY 6 HOURS PRN
Status: DISCONTINUED | OUTPATIENT
Start: 2021-02-11 | End: 2021-02-13 | Stop reason: HOSPADM

## 2021-02-11 RX ORDER — HALOPERIDOL 5 MG/ML
1 INJECTION INTRAMUSCULAR EVERY 6 HOURS PRN
Status: DISCONTINUED | OUTPATIENT
Start: 2021-02-11 | End: 2021-02-13 | Stop reason: HOSPADM

## 2021-02-11 RX ORDER — SODIUM CHLORIDE 9 MG/ML
500 INJECTION, SOLUTION INTRAVENOUS
Status: DISCONTINUED | OUTPATIENT
Start: 2021-02-11 | End: 2021-02-13 | Stop reason: HOSPADM

## 2021-02-11 RX ORDER — ONDANSETRON 2 MG/ML
4 INJECTION INTRAMUSCULAR; INTRAVENOUS EVERY 4 HOURS PRN
Status: DISCONTINUED | OUTPATIENT
Start: 2021-02-11 | End: 2021-02-13 | Stop reason: HOSPADM

## 2021-02-11 RX ORDER — PHENYLEPHRINE HCL IN 0.9% NACL 0.5 MG/5ML
SYRINGE (ML) INTRAVENOUS PRN
Status: DISCONTINUED | OUTPATIENT
Start: 2021-02-11 | End: 2021-02-11 | Stop reason: SURG

## 2021-02-11 RX ORDER — LABETALOL HYDROCHLORIDE 5 MG/ML
INJECTION, SOLUTION INTRAVENOUS PRN
Status: DISCONTINUED | OUTPATIENT
Start: 2021-02-11 | End: 2021-02-11 | Stop reason: SURG

## 2021-02-11 RX ORDER — ACETAMINOPHEN 500 MG
1000 TABLET ORAL EVERY 6 HOURS PRN
Status: DISCONTINUED | OUTPATIENT
Start: 2021-02-11 | End: 2021-02-13 | Stop reason: HOSPADM

## 2021-02-11 RX ORDER — SODIUM CHLORIDE, SODIUM LACTATE, POTASSIUM CHLORIDE, CALCIUM CHLORIDE 600; 310; 30; 20 MG/100ML; MG/100ML; MG/100ML; MG/100ML
INJECTION, SOLUTION INTRAVENOUS CONTINUOUS
Status: ACTIVE | OUTPATIENT
Start: 2021-02-11 | End: 2021-02-11

## 2021-02-11 RX ORDER — OXYCODONE HYDROCHLORIDE 5 MG/1
5 TABLET ORAL
Status: DISCONTINUED | OUTPATIENT
Start: 2021-02-11 | End: 2021-02-13 | Stop reason: HOSPADM

## 2021-02-11 RX ORDER — ACETAMINOPHEN 500 MG
1000 TABLET ORAL ONCE
Status: COMPLETED | OUTPATIENT
Start: 2021-02-11 | End: 2021-02-11

## 2021-02-11 RX ORDER — SIMETHICONE 80 MG
80 TABLET,CHEWABLE ORAL EVERY 8 HOURS PRN
Status: DISCONTINUED | OUTPATIENT
Start: 2021-02-11 | End: 2021-02-13 | Stop reason: HOSPADM

## 2021-02-11 RX ORDER — LIDOCAINE HYDROCHLORIDE 20 MG/ML
INJECTION, SOLUTION EPIDURAL; INFILTRATION; INTRACAUDAL; PERINEURAL PRN
Status: DISCONTINUED | OUTPATIENT
Start: 2021-02-11 | End: 2021-02-11 | Stop reason: SURG

## 2021-02-11 RX ORDER — LEVOTHYROXINE SODIUM 0.12 MG/1
125 TABLET ORAL
Status: DISCONTINUED | OUTPATIENT
Start: 2021-02-12 | End: 2021-02-13 | Stop reason: HOSPADM

## 2021-02-11 RX ORDER — BUPIVACAINE HYDROCHLORIDE AND EPINEPHRINE 5; 5 MG/ML; UG/ML
INJECTION, SOLUTION EPIDURAL; INTRACAUDAL; PERINEURAL
Status: DISCONTINUED | OUTPATIENT
Start: 2021-02-11 | End: 2021-02-11 | Stop reason: HOSPADM

## 2021-02-11 RX ORDER — CEFAZOLIN SODIUM 1 G/50ML
1 INJECTION, SOLUTION INTRAVENOUS EVERY 8 HOURS
Status: COMPLETED | OUTPATIENT
Start: 2021-02-11 | End: 2021-02-12

## 2021-02-11 RX ORDER — NEOSTIGMINE METHYLSULFATE 1 MG/ML
INJECTION, SOLUTION INTRAVENOUS PRN
Status: DISCONTINUED | OUTPATIENT
Start: 2021-02-11 | End: 2021-02-11 | Stop reason: SURG

## 2021-02-11 RX ORDER — OXYCODONE HYDROCHLORIDE 10 MG/1
10 TABLET ORAL
Status: DISCONTINUED | OUTPATIENT
Start: 2021-02-11 | End: 2021-02-13 | Stop reason: HOSPADM

## 2021-02-11 RX ORDER — ROCURONIUM BROMIDE 10 MG/ML
INJECTION, SOLUTION INTRAVENOUS PRN
Status: DISCONTINUED | OUTPATIENT
Start: 2021-02-11 | End: 2021-02-11 | Stop reason: HOSPADM

## 2021-02-11 RX ORDER — SCOLOPAMINE TRANSDERMAL SYSTEM 1 MG/1
1 PATCH, EXTENDED RELEASE TRANSDERMAL
Status: DISCONTINUED | OUTPATIENT
Start: 2021-02-11 | End: 2021-02-13 | Stop reason: HOSPADM

## 2021-02-11 RX ORDER — SODIUM CHLORIDE, SODIUM LACTATE, POTASSIUM CHLORIDE, CALCIUM CHLORIDE 600; 310; 30; 20 MG/100ML; MG/100ML; MG/100ML; MG/100ML
INJECTION, SOLUTION INTRAVENOUS CONTINUOUS
Status: DISCONTINUED | OUTPATIENT
Start: 2021-02-11 | End: 2021-02-11 | Stop reason: HOSPADM

## 2021-02-11 RX ORDER — HYDROMORPHONE HYDROCHLORIDE 1 MG/ML
0.2 INJECTION, SOLUTION INTRAMUSCULAR; INTRAVENOUS; SUBCUTANEOUS
Status: DISCONTINUED | OUTPATIENT
Start: 2021-02-11 | End: 2021-02-11 | Stop reason: HOSPADM

## 2021-02-11 RX ORDER — HYDROMORPHONE HYDROCHLORIDE 1 MG/ML
0.4 INJECTION, SOLUTION INTRAMUSCULAR; INTRAVENOUS; SUBCUTANEOUS
Status: DISCONTINUED | OUTPATIENT
Start: 2021-02-11 | End: 2021-02-11 | Stop reason: HOSPADM

## 2021-02-11 RX ADMIN — LABETALOL HYDROCHLORIDE 10 MG: 5 INJECTION, SOLUTION INTRAVENOUS at 08:32

## 2021-02-11 RX ADMIN — CEFAZOLIN 2 G: 330 INJECTION, POWDER, FOR SOLUTION INTRAMUSCULAR; INTRAVENOUS at 07:49

## 2021-02-11 RX ADMIN — PROPOFOL 150 MG: 10 INJECTION, EMULSION INTRAVENOUS at 07:43

## 2021-02-11 RX ADMIN — GLYCOPYRROLATE 0.2 MG: 0.2 INJECTION INTRAMUSCULAR; INTRAVENOUS at 10:14

## 2021-02-11 RX ADMIN — FENTANYL CITRATE 25 MCG: 50 INJECTION, SOLUTION INTRAMUSCULAR; INTRAVENOUS at 11:20

## 2021-02-11 RX ADMIN — ACETAMINOPHEN 1000 MG: 500 TABLET ORAL at 15:30

## 2021-02-11 RX ADMIN — ROCURONIUM BROMIDE 10 MG: 10 INJECTION, SOLUTION INTRAVENOUS at 08:50

## 2021-02-11 RX ADMIN — ROCURONIUM BROMIDE 10 MG: 10 INJECTION, SOLUTION INTRAVENOUS at 08:09

## 2021-02-11 RX ADMIN — CEFAZOLIN SODIUM 1 G: 1 INJECTION, SOLUTION INTRAVENOUS at 23:40

## 2021-02-11 RX ADMIN — KETOROLAC TROMETHAMINE 30 MG: 30 INJECTION, SOLUTION INTRAMUSCULAR at 10:13

## 2021-02-11 RX ADMIN — ROCURONIUM BROMIDE 10 MG: 10 INJECTION, SOLUTION INTRAVENOUS at 08:04

## 2021-02-11 RX ADMIN — DEXAMETHASONE SODIUM PHOSPHATE 8 MG: 4 INJECTION, SOLUTION INTRA-ARTICULAR; INTRALESIONAL; INTRAMUSCULAR; INTRAVENOUS; SOFT TISSUE at 07:50

## 2021-02-11 RX ADMIN — FENTANYL CITRATE 50 MCG: 50 INJECTION, SOLUTION INTRAMUSCULAR; INTRAVENOUS at 10:46

## 2021-02-11 RX ADMIN — LIDOCAINE HYDROCHLORIDE 0.5 ML: 10 INJECTION, SOLUTION EPIDURAL; INFILTRATION; INTRACAUDAL at 06:54

## 2021-02-11 RX ADMIN — CELECOXIB 200 MG: 200 CAPSULE ORAL at 15:28

## 2021-02-11 RX ADMIN — OXYCODONE HYDROCHLORIDE 10 MG: 5 SOLUTION ORAL at 10:46

## 2021-02-11 RX ADMIN — CEFAZOLIN SODIUM 1 G: 1 INJECTION, SOLUTION INTRAVENOUS at 16:30

## 2021-02-11 RX ADMIN — ROCURONIUM BROMIDE 10 MG: 10 INJECTION, SOLUTION INTRAVENOUS at 09:23

## 2021-02-11 RX ADMIN — ACETAMINOPHEN 1000 MG: 500 TABLET ORAL at 21:32

## 2021-02-11 RX ADMIN — FENTANYL CITRATE 50 MCG: 50 INJECTION, SOLUTION INTRAMUSCULAR; INTRAVENOUS at 08:28

## 2021-02-11 RX ADMIN — SODIUM CHLORIDE: 9 INJECTION, SOLUTION INTRAVENOUS at 21:32

## 2021-02-11 RX ADMIN — NEOSTIGMINE METHYLSULFATE 1 MG: 1 INJECTION INTRAVENOUS at 10:14

## 2021-02-11 RX ADMIN — FENTANYL CITRATE 100 MCG: 50 INJECTION, SOLUTION INTRAMUSCULAR; INTRAVENOUS at 07:43

## 2021-02-11 RX ADMIN — ONDANSETRON 4 MG: 2 INJECTION INTRAMUSCULAR; INTRAVENOUS at 10:46

## 2021-02-11 RX ADMIN — POVIDONE IODINE 15 ML: 100 SOLUTION TOPICAL at 06:33

## 2021-02-11 RX ADMIN — FENTANYL CITRATE 50 MCG: 50 INJECTION, SOLUTION INTRAMUSCULAR; INTRAVENOUS at 09:37

## 2021-02-11 RX ADMIN — SODIUM CHLORIDE, POTASSIUM CHLORIDE, SODIUM LACTATE AND CALCIUM CHLORIDE: 600; 310; 30; 20 INJECTION, SOLUTION INTRAVENOUS at 07:37

## 2021-02-11 RX ADMIN — SODIUM CHLORIDE, POTASSIUM CHLORIDE, SODIUM LACTATE AND CALCIUM CHLORIDE: 600; 310; 30; 20 INJECTION, SOLUTION INTRAVENOUS at 06:55

## 2021-02-11 RX ADMIN — SODIUM CHLORIDE, POTASSIUM CHLORIDE, SODIUM LACTATE AND CALCIUM CHLORIDE: 600; 310; 30; 20 INJECTION, SOLUTION INTRAVENOUS at 10:17

## 2021-02-11 RX ADMIN — LIDOCAINE HYDROCHLORIDE 100 MG: 20 INJECTION, SOLUTION EPIDURAL; INFILTRATION; INTRACAUDAL at 07:43

## 2021-02-11 RX ADMIN — ROCURONIUM BROMIDE 40 MG: 10 INJECTION, SOLUTION INTRAVENOUS at 07:43

## 2021-02-11 RX ADMIN — Medication 200 MCG: at 08:07

## 2021-02-11 RX ADMIN — FENTANYL CITRATE 25 MCG: 50 INJECTION, SOLUTION INTRAMUSCULAR; INTRAVENOUS at 11:03

## 2021-02-11 RX ADMIN — ONDANSETRON 4 MG: 2 INJECTION INTRAMUSCULAR; INTRAVENOUS at 10:10

## 2021-02-11 RX ADMIN — PROPOFOL 30 MG: 10 INJECTION, EMULSION INTRAVENOUS at 10:24

## 2021-02-11 RX ADMIN — FENTANYL CITRATE 50 MCG: 50 INJECTION, SOLUTION INTRAMUSCULAR; INTRAVENOUS at 08:12

## 2021-02-11 ASSESSMENT — COGNITIVE AND FUNCTIONAL STATUS - GENERAL
STANDING UP FROM CHAIR USING ARMS: A LITTLE
MOBILITY SCORE: 17
CLIMB 3 TO 5 STEPS WITH RAILING: A LOT
MOVING TO AND FROM BED TO CHAIR: A LITTLE
EATING MEALS: A LITTLE
WALKING IN HOSPITAL ROOM: A LITTLE
TURNING FROM BACK TO SIDE WHILE IN FLAT BAD: A LITTLE
PERSONAL GROOMING: A LITTLE
DRESSING REGULAR LOWER BODY CLOTHING: A LITTLE
TOILETING: A LOT
SUGGESTED CMS G CODE MODIFIER MOBILITY: CK
SUGGESTED CMS G CODE MODIFIER DAILY ACTIVITY: CK
DRESSING REGULAR UPPER BODY CLOTHING: A LITTLE
HELP NEEDED FOR BATHING: A LITTLE
DAILY ACTIVITIY SCORE: 17
MOVING FROM LYING ON BACK TO SITTING ON SIDE OF FLAT BED: A LITTLE

## 2021-02-11 ASSESSMENT — PAIN DESCRIPTION - PAIN TYPE
TYPE: SURGICAL PAIN

## 2021-02-11 ASSESSMENT — PATIENT HEALTH QUESTIONNAIRE - PHQ9
1. LITTLE INTEREST OR PLEASURE IN DOING THINGS: NOT AT ALL
2. FEELING DOWN, DEPRESSED, IRRITABLE, OR HOPELESS: NOT AT ALL
SUM OF ALL RESPONSES TO PHQ9 QUESTIONS 1 AND 2: 0

## 2021-02-11 ASSESSMENT — LIFESTYLE VARIABLES
EVER FELT BAD OR GUILTY ABOUT YOUR DRINKING: NO
HAVE PEOPLE ANNOYED YOU BY CRITICIZING YOUR DRINKING: NO
ON A TYPICAL DAY WHEN YOU DRINK ALCOHOL HOW MANY DRINKS DO YOU HAVE: 1
TOTAL SCORE: 0
CONSUMPTION TOTAL: NEGATIVE
TOTAL SCORE: 0
HAVE YOU EVER FELT YOU SHOULD CUT DOWN ON YOUR DRINKING: NO
ALCOHOL_USE: YES
DOES PATIENT WANT TO STOP DRINKING: NO
EVER HAD A DRINK FIRST THING IN THE MORNING TO STEADY YOUR NERVES TO GET RID OF A HANGOVER: NO
HOW MANY TIMES IN THE PAST YEAR HAVE YOU HAD 5 OR MORE DRINKS IN A DAY: 0
AVERAGE NUMBER OF DAYS PER WEEK YOU HAVE A DRINK CONTAINING ALCOHOL: 1
TOTAL SCORE: 0

## 2021-02-11 ASSESSMENT — PAIN SCALES - GENERAL: PAIN_LEVEL: 2

## 2021-02-11 NOTE — PROGRESS NOTES
2 RN skin check with Adi CONTRERAS completed. Pt has 6 lap sites (one with a CHIQUI drain) on her abd. All other skin was intact. SCD's, Continuous pulse ox, garcia catheter, and oxygen in place. This nurse will continue to monitor pt.

## 2021-02-11 NOTE — ANESTHESIA TIME REPORT
Anesthesia Start and Stop Event Times     Date Time Event    2/11/2021 0711 Ready for Procedure     0737 Anesthesia Start     1035 Anesthesia Stop        Responsible Staff  02/11/21    Name Role Begin End    Jung Guthrie M.D. Anesth 0737 1035        Preop Diagnosis (Free Text):  Pre-op Diagnosis     RENAL MASS        Preop Diagnosis (Codes):    Post op Diagnosis  Renal mass, right      Premium Reason  Non-Premium    Comments:

## 2021-02-11 NOTE — OP REPORT
DATE OF SERVICE:  02/11/2021     PREOPERATIVE DIAGNOSIS:  Right renal mass.     POSTOPERATIVE DIAGNOSIS:  Right renal mass.     PROCEDURES PERFORMED:  1.  Robotic-assisted laparoscopic right partial nephrectomy.  2.  Intraoperative right renal ultrasonography.  3.  Lysis of adhesions.     SURGEON:  Dylan Jurado MD     ASSISTANT:  Benson lang MD     ANESTHESIOLOGIST:  Jung Guthrie MD     TYPE OF ANESTHESIA:  General endotracheal tube.     INDICATIONS:  This is a 65-year-old female undergoing abdominal imaging was   noted to have multicystic renal masses bilaterally.  On the right kidney,   there was a combined cystic and solid mass of the lateral cortical surface of   the kidney with overlying fatty mass classified as a Bosniak III renal cyst.    She is taken now for partial nephrectomy.     FINDINGS:  There was a fatty mass with underlying echogenic distorted solid   tissue.  This was identified and margins marked using intraoperative renal   ultrasonography.  Mass was excised and renorrhaphy accomplished.     DESCRIPTION OF PROCEDURE:  The patient was identified in the holding area.    She was taken to the operative suite.  General anesthesia was administered by   Dr. Guthrie.  Urethral Villegas catheter was placed and left throughout the   procedure.  She was then positioned in the full flank position with the right   side up.  Pressure points were padded and axillary roll in place.  She was   secured to the table with towels and tape.  She was then sterilely prepped and   draped.  Cefazolin was given intravenously.  Timeout was called.  Correct   patient and site of surgery was confirmed.     In the right upper abdomen, the skin was elevated between towel clamps and a   Veress needle passed into the peritoneal cavity.  The peritoneal cavity was   then insufflated with carbon dioxide to 15 mmHg pressure.  Through that same   site, an 8 mm robotic port was placed and laparoscopy performed.  There was    evidence of adhesions of the small bowel to the right lower quadrant and   sidewall where previous right colectomy and retroperitoneal cystadenoma had   been excised.  There were no significant adhesions to the anterior abdominal   wall.  Three additional 8 mm robotic ports and a 12 mm infraumbilical port   were placed.  A 5 mm subxiphoid port was placed.     The procedure was initiated after introducing robotic instruments including   monopolar scissor on the right, fenestrated bipolar and ProGrasp on the left   under direct vision.     Initially, I freed adhesions of the small bowel to the sidewall, mobilizing   the matted small bowel medially up off of the right retroperitoneum.  The   gonadal vein was identified and protected medially.  The ureter was retracted   laterally.  The vena cava was identified and dissection over the vena cava   performed, freeing its right lateral surface and identifying an accessory   lower pole renal vein inferiorly and the main renal vein more cephalad.    Between these 2, the renal artery was identified and dissected and   skeletonized circumferentially for later application of bulldog clamps.     I then freed Gerota?s fascia off of the surface of the kidney.  The kidney was   marked with multiple cysts.  Superolaterally, there was an exophytic fatty   mass with underlying solid and cystic components identified on renal   ultrasonography.  After completely freeing the kidney to rotate medially and   inferiorly to access the anterior, posterior cephalad and inferior aspects of   this mass, Dr. Chan performed intraoperative renal ultrasonography.  We   were able to identify the margins of the heterogeneous mass, abutting normal   tissue and marked those on the cortical surface of the kidney with   electrocautery.     Once the ultrasonography had marked the extent of the tumor, I then had 2   bulldog clamps placed onto the single renal artery.  Under 28 minutes of warm    ischemia time, the mass was excised and renorrhaphy accomplished.  As the mass   was excised, the renal collecting system was entered.  This was repaired with   running 3-0 Vicryl suture.  Large blood vessels were oversewn with 3-0 Vicryl   suture as well.  The medullary portion of the kidney was oversewn with a 3-0   Monocryl suture using the Hem-o-rodrigo Lapra-Ty technique on the surface for   tensioning.  The 0 Vicryl sutures were then used to approximate the cut   cortical edges of the kidney and pinched the kidney closed with similar   Hem-o-Rodrigo Lapra-Ty technique.  This resulted in excellent hemostasis.     Vistaseal was applied and Gerota?s fascia reapproximated around the kidney.  A   closed suction drain was placed in close proximity to the right kidney and   brought out through the most inferior lateral 8 mm port.  The specimen had   been bagged in a specimen sac, which was then extracted from the 12 mm   infraumbilical port site.     All of the robotic ports were removed under direct vision.  Drain site was   inspected prior to extracting the specimen.  The fascia at the midline   infraumbilical incision was closed with 0 Vicryl and the subcutaneous tissue   was irrigated.  Skin at the port sites and extraction site was closed with 4-0   Monocryl, secured with Dermabond.     The patient suffered no intraoperative complications.  Estimated blood loss   was 50-75 mL.  She was sent to recovery room in stable condition.        ______________________________  MD KOURTNEY Iglesias/REMA    DD:  02/11/2021 10:58  DT:  02/11/2021 11:32    Job#:  [id number]

## 2021-02-11 NOTE — OR NURSING
"Patient recovering well post op, AAOx4, calm, c/o mid lower abdominal \"soreness and pressure\" 7/10 initially post op, down to 3/10 with oral and IV pain medication. Nausea improved, tolerating sips of water and juice. Abdominal laparoscopic incisions x6 with dermabond CDI, CHIQUI draining sanguineous output (20ml since surgery). Villegas draining clear pink/red urine, no blood clots noted.  VSS, afebrile, 2L nasal cannula breathing even and unlabored. Belongings at bedside.  updated on status and plan of care.     Bed status pending. Oxygen tank 3/4 full for transport.   "

## 2021-02-11 NOTE — ANESTHESIA PROCEDURE NOTES
Airway    Date/Time: 2/11/2021 7:43 AM  Performed by: Jung Guthrie M.D.  Authorized by: Jung Guthrie M.D.     Location:  OR  Urgency:  Elective  Indications for Airway Management:  Anesthesia      Spontaneous Ventilation: absent    Sedation Level:  Deep  Preoxygenated: Yes    Patient Position:  Sniffing  Mask Difficulty Assessment:  0 - not attempted  Final Airway Type:  Endotracheal airway  Final Endotracheal Airway:  ETT  Cuffed: Yes    Technique Used for Successful ETT Placement:  Direct laryngoscopy    Insertion Site:  Oral  Blade Type:  Drake  Laryngoscope Blade/Videolaryngoscope Blade Size:  3  ETT Size (mm):  7.0  Measured from:  Teeth  ETT to Teeth (cm):  19  Placement Verified by: auscultation and capnometry    Cormack-Lehane Classification:  Grade I - full view of glottis  Number of Attempts at Approach:  1

## 2021-02-11 NOTE — ANESTHESIA POSTPROCEDURE EVALUATION
Patient: Hilda Emanuel    Procedure Summary     Date: 02/11/21 Room / Location: [place] [id number] / SURGERY [facility]    Anesthesia Start: 0737 Anesthesia Stop: 1035    Procedure: NEPHRECTOMY, PARTIAL, ROBOT-ASSISTED, USING DA YUNIEL XI-WITH INTRAOP ULTRASOUND (Right Abdomen) Diagnosis: (RENAL MASS)    Surgeons: Dylan Jurado M.D. Responsible Provider: Jung Guthrie M.D.    Anesthesia Type: general ASA Status: 2          Final Anesthesia Type: general  Last vitals  BP   Blood Pressure : 117/59    Temp   36 °C (96.8 °F)    Pulse   64   Resp   14    SpO2   97 %      Anesthesia Post Evaluation    Patient location during evaluation: PACU  Patient participation: complete - patient participated  Level of consciousness: awake and alert  Pain score: 2    Airway patency: patent  Anesthetic complications: no  Cardiovascular status: hemodynamically stable  Respiratory status: acceptable  Hydration status: euvolemic    PONV: none          No complications documented.     Nurse Pain Score: 2 (NPRS)

## 2021-02-11 NOTE — OR SURGEON
Immediate Post OP Note    PreOp Diagnosis: right renal mass    PostOp Diagnosis: same    Procedure(s):  NEPHRECTOMY, PARTIAL, ROBOT-ASSISTED, USING DA YUNIEL XI-WITH INTRAOP ULTRASOUND - Wound Class: Clean with Drain    Surgeon(s):  RANJIT Hughes M.D.    Anesthesiologist/Type of Anesthesia:  Anesthesiologist: Jnug Guthrie M.D./General    Surgical Staff:  Circulator: MARIA DE JESUS Gibbons Circulator: MARIA DE JESUS Worley Scrub: Tila Patel  Scrub Person: Getachew Romero    Specimens removed if any:  ID Type Source Tests Collected by Time Destination   A : RIGHT RENAL MASS Tissue Kidney PATHOLOGY SPECIMEN Dylan Jurado M.D. 2/11/2021 1010        Estimated Blood Loss: 50-75 cc    Findings: exophytic hyperechoic mass rt kidney with overlying fat    Complications: none        2/11/2021 10:49 AM Dylan Jurado M.D.

## 2021-02-11 NOTE — PROGRESS NOTES
Patient in pre-op, assessment completed, patient and  Peter  updated on plan of care, all questions answered, no further needs at this time, call light within reach.

## 2021-02-12 PROBLEM — N28.89 RENAL MASS, RIGHT: Status: ACTIVE | Noted: 2021-02-12

## 2021-02-12 LAB
ABO GROUP BLD: NORMAL
ANION GAP SERPL CALC-SCNC: 10 MMOL/L (ref 7–16)
BLD GP AB SCN SERPL QL: NORMAL
BUN SERPL-MCNC: 27 MG/DL (ref 8–22)
CALCIUM SERPL-MCNC: 7.9 MG/DL (ref 8.5–10.5)
CHLORIDE SERPL-SCNC: 106 MMOL/L (ref 96–112)
CO2 SERPL-SCNC: 21 MMOL/L (ref 20–33)
CREAT SERPL-MCNC: 1.83 MG/DL (ref 0.5–1.4)
ERYTHROCYTE [DISTWIDTH] IN BLOOD BY AUTOMATED COUNT: 46.8 FL (ref 35.9–50)
GLUCOSE SERPL-MCNC: 105 MG/DL (ref 65–99)
HCT VFR BLD AUTO: 32 % (ref 37–47)
HGB BLD-MCNC: 10.5 G/DL (ref 12–16)
MCH RBC QN AUTO: 30.8 PG (ref 27–33)
MCHC RBC AUTO-ENTMCNC: 32.8 G/DL (ref 33.6–35)
MCV RBC AUTO: 93.8 FL (ref 81.4–97.8)
PLATELET # BLD AUTO: 148 K/UL (ref 164–446)
PMV BLD AUTO: 11.1 FL (ref 9–12.9)
POTASSIUM SERPL-SCNC: 4.7 MMOL/L (ref 3.6–5.5)
RBC # BLD AUTO: 3.41 M/UL (ref 4.2–5.4)
RH BLD: NORMAL
SODIUM SERPL-SCNC: 137 MMOL/L (ref 135–145)
WBC # BLD AUTO: 14.1 K/UL (ref 4.8–10.8)

## 2021-02-12 PROCEDURE — 700105 HCHG RX REV CODE 258: Performed by: UROLOGY

## 2021-02-12 PROCEDURE — 94760 N-INVAS EAR/PLS OXIMETRY 1: CPT

## 2021-02-12 PROCEDURE — 700111 HCHG RX REV CODE 636 W/ 250 OVERRIDE (IP): Performed by: UROLOGY

## 2021-02-12 PROCEDURE — 96375 TX/PRO/DX INJ NEW DRUG ADDON: CPT

## 2021-02-12 PROCEDURE — 96372 THER/PROPH/DIAG INJ SC/IM: CPT

## 2021-02-12 PROCEDURE — 86900 BLOOD TYPING SEROLOGIC ABO: CPT

## 2021-02-12 PROCEDURE — A9270 NON-COVERED ITEM OR SERVICE: HCPCS | Performed by: UROLOGY

## 2021-02-12 PROCEDURE — A9270 NON-COVERED ITEM OR SERVICE: HCPCS | Performed by: PHYSICIAN ASSISTANT

## 2021-02-12 PROCEDURE — 85027 COMPLETE CBC AUTOMATED: CPT

## 2021-02-12 PROCEDURE — 86850 RBC ANTIBODY SCREEN: CPT

## 2021-02-12 PROCEDURE — 86901 BLOOD TYPING SEROLOGIC RH(D): CPT

## 2021-02-12 PROCEDURE — 700102 HCHG RX REV CODE 250 W/ 637 OVERRIDE(OP): Performed by: PHYSICIAN ASSISTANT

## 2021-02-12 PROCEDURE — 80048 BASIC METABOLIC PNL TOTAL CA: CPT

## 2021-02-12 PROCEDURE — 700102 HCHG RX REV CODE 250 W/ 637 OVERRIDE(OP): Performed by: UROLOGY

## 2021-02-12 PROCEDURE — G0378 HOSPITAL OBSERVATION PER HR: HCPCS

## 2021-02-12 PROCEDURE — 36415 COLL VENOUS BLD VENIPUNCTURE: CPT

## 2021-02-12 RX ORDER — OXYCODONE HYDROCHLORIDE 5 MG/1
5 TABLET ORAL EVERY 8 HOURS PRN
Qty: 9 TABLET | Refills: 0 | Status: SHIPPED | OUTPATIENT
Start: 2021-02-12 | End: 2021-02-15

## 2021-02-12 RX ADMIN — OXYCODONE 5 MG: 5 TABLET ORAL at 12:03

## 2021-02-12 RX ADMIN — OXYCODONE 5 MG: 5 TABLET ORAL at 21:23

## 2021-02-12 RX ADMIN — ACETAMINOPHEN 1000 MG: 500 TABLET ORAL at 05:05

## 2021-02-12 RX ADMIN — OXYCODONE 5 MG: 5 TABLET ORAL at 05:08

## 2021-02-12 RX ADMIN — OXYCODONE 5 MG: 5 TABLET ORAL at 00:44

## 2021-02-12 RX ADMIN — DOCUSATE SODIUM 50 MG AND SENNOSIDES 8.6 MG 2 TABLET: 8.6; 5 TABLET, FILM COATED ORAL at 21:23

## 2021-02-12 RX ADMIN — SODIUM CHLORIDE: 9 INJECTION, SOLUTION INTRAVENOUS at 10:56

## 2021-02-12 RX ADMIN — ENOXAPARIN SODIUM 40 MG: 40 INJECTION SUBCUTANEOUS at 05:04

## 2021-02-12 RX ADMIN — LEVOTHYROXINE SODIUM 125 MCG: 0.12 TABLET ORAL at 05:04

## 2021-02-12 RX ADMIN — OXYCODONE 5 MG: 5 TABLET ORAL at 18:19

## 2021-02-12 RX ADMIN — ONDANSETRON 4 MG: 2 INJECTION INTRAMUSCULAR; INTRAVENOUS at 18:14

## 2021-02-12 ASSESSMENT — PAIN DESCRIPTION - PAIN TYPE
TYPE: SURGICAL PAIN
TYPE: SURGICAL PAIN

## 2021-02-12 NOTE — DISCHARGE SUMMARY
Discharge Summary    CHIEF COMPLAINT ON ADMISSION  Renal Mass    Reason for Admission  RENAL MASS     Admission Date  2/11/2021    CODE STATUS  Full Code    HPI & HOSPITAL COURSE  This is a 65 y.o. female here with right renal mass that is now s/p right robot assisted partial nephrectomy 02/11/2021.  She has done well following the procedure. She reports some pain but well controlled. Tolerating clear diet. Denies nausea, vomiting. + flatus, no BM.  Denies fevers, chills, nausea vomiting. At this point she is stable for discharge.    Therefore, she is discharged in good and stable condition to home with close outpatient follow-up.    The patient recovered much more quickly than anticipated on admission.       PE:  Gen: NAD  Abd: Incision sites with dressing and dermabond C/D/I. Sihva with serosanguinous output.  : garcia with clear yellow output  Psyche: Mood, judgement, affect normal.    Discharge Date  02/12    FOLLOW UP ITEMS POST DISCHARGE  Post op with [facility]    DISCHARGE DIAGNOSES  Active Problems:    Renal mass, right POA: Unknown  Resolved Problems:    * No resolved hospital problems. *      FOLLOW UP  Post op with [facility] [place]    MEDICATIONS ON DISCHARGE     Medication List      START taking these medications      Instructions   oxyCODONE immediate-release 5 MG Tabs  Commonly known as: ROXICODONE   Take 1 tablet by mouth every 8 hours as needed for up to 3 days.  Dose: 5 mg        CONTINUE taking these medications      Instructions   acetaminophen 500 MG Tabs  Commonly known as: TYLENOL   Take 500-1,000 mg by mouth every 6 hours as needed.  Dose: 500-1,000 mg     Calcium 600 + D 600-400 MG-UNIT Tabs  Generic drug: Calcium Carbonate-Vitamin D   Take 1 Tab by mouth every day.  Dose: 1 tablet     lansoprazole 15 MG Cpdr  Commonly known as: PREVACID   Take 1 capsule by mouth every morning.  Dose: 1 capsule     levothyroxine 125 MCG Tabs  Commonly known as: SYNTHROID   Take 125 mcg by mouth Every morning  on an empty stomach. Brand Name Only  Dose: 125 mcg     multivitamin Tabs   Take 1 Tab by mouth every day. Also takes : collagen Peptides, 1 scoop qday  Dose: 1 tablet     SUPER B COMPLEX PO   Take 1 Tab by mouth every day.  Dose: 1 tablet     VITAMIN C ER PO   Take 1 Tab by mouth every day.  Dose: 1 tablet     Vitamin D3 2000 UNIT Caps   Take 2 Caps by mouth every day.  Dose: 2 capsule            Allergies  Allergies   Allergen Reactions   • Allopurinol Rash     .   • Septra [Bactrim] Rash and Vomiting     .   • Spironolactone Rash     .   • Sulfa Drugs Rash     NAUSEA AND VOMITING       DIET  Orders Placed This Encounter   Procedures   • Diet Order Diet: Clear Liquid     Standing Status:   Standing     Number of Occurrences:   1     Order Specific Question:   Diet:     Answer:   Clear Liquid [10]       ACTIVITY  Light duty.  10-lb lifting restriction    CONSULTATIONS  None    PROCEDURES  s/p right robot assisted partial nephrectomy 02/11/202    LABORATORY  Lab Results   Component Value Date    SODIUM 137 02/12/2021    POTASSIUM 4.7 02/12/2021    CHLORIDE 106 02/12/2021    CO2 21 02/12/2021    GLUCOSE 105 (H) 02/12/2021    BUN 27 (H) 02/12/2021    CREATININE 1.83 (H) 02/12/2021        Lab Results   Component Value Date    WBC 14.1 (H) 02/12/2021    HEMOGLOBIN 10.5 (L) 02/12/2021    HEMATOCRIT 32.0 (L) 02/12/2021    PLATELETCT 148 (L) 02/12/2021        Total time of the discharge process exceeds 35 minutes.

## 2021-02-13 VITALS
OXYGEN SATURATION: 94 % | HEIGHT: 64 IN | RESPIRATION RATE: 20 BRPM | BODY MASS INDEX: 32.74 KG/M2 | TEMPERATURE: 99.8 F | DIASTOLIC BLOOD PRESSURE: 78 MMHG | HEART RATE: 89 BPM | SYSTOLIC BLOOD PRESSURE: 165 MMHG | WEIGHT: 191.8 LBS

## 2021-02-13 PROCEDURE — 51798 US URINE CAPACITY MEASURE: CPT

## 2021-02-13 PROCEDURE — A9270 NON-COVERED ITEM OR SERVICE: HCPCS | Performed by: PHYSICIAN ASSISTANT

## 2021-02-13 PROCEDURE — 700102 HCHG RX REV CODE 250 W/ 637 OVERRIDE(OP): Performed by: UROLOGY

## 2021-02-13 PROCEDURE — 700102 HCHG RX REV CODE 250 W/ 637 OVERRIDE(OP): Performed by: PHYSICIAN ASSISTANT

## 2021-02-13 PROCEDURE — G0378 HOSPITAL OBSERVATION PER HR: HCPCS

## 2021-02-13 PROCEDURE — 96372 THER/PROPH/DIAG INJ SC/IM: CPT

## 2021-02-13 PROCEDURE — 700105 HCHG RX REV CODE 258: Performed by: UROLOGY

## 2021-02-13 PROCEDURE — A9270 NON-COVERED ITEM OR SERVICE: HCPCS | Performed by: UROLOGY

## 2021-02-13 PROCEDURE — 700111 HCHG RX REV CODE 636 W/ 250 OVERRIDE (IP): Performed by: UROLOGY

## 2021-02-13 RX ADMIN — ACETAMINOPHEN 1000 MG: 500 TABLET ORAL at 08:30

## 2021-02-13 RX ADMIN — LEVOTHYROXINE SODIUM 125 MCG: 0.12 TABLET ORAL at 05:06

## 2021-02-13 RX ADMIN — ACETAMINOPHEN 1000 MG: 500 TABLET ORAL at 15:45

## 2021-02-13 RX ADMIN — SODIUM CHLORIDE: 9 INJECTION, SOLUTION INTRAVENOUS at 05:07

## 2021-02-13 RX ADMIN — ENOXAPARIN SODIUM 40 MG: 40 INJECTION SUBCUTANEOUS at 05:06

## 2021-02-13 ASSESSMENT — PAIN DESCRIPTION - PAIN TYPE: TYPE: SURGICAL PAIN

## 2021-02-13 NOTE — PROGRESS NOTES
Pt voided 200mL. Bladder scan completed after- 468mL seen. Spoke with Dr. Jurado on the phone and he stated that it is okay to wait and not straight cath the pt again at this time and see if she will void on her own.

## 2021-02-13 NOTE — RESPIRATORY CARE
OXYGEN EDUCATION by CLINICAL COPD EDUCATOR  2/13/2021 at 3:04 PM by Kari Alberto, RRT      Patient seen for oxygen education. Discussed how to turn the tank on and adjust liter flow, safety considerations, and importance of using oxygen as prescribed. Pt verbalized understanding and demonstrated how to properly turn tank on and adjust liter flow. No further questions at this time.

## 2021-02-13 NOTE — DISCHARGE PLANNING
Received Choice form at 0312  Agency/Facility Name: [facility]  Referral sent per Choice form @ [id number]

## 2021-02-13 NOTE — PROGRESS NOTES
Pt voided 250 cc,  cc, Urology PA notified, ok to place garcia but asked to wait until he arrived to discuss options with pt. Will continue to monitor.

## 2021-02-13 NOTE — PROGRESS NOTES
Pt voided 200 mL, bladder scan after shows 615 mL. RN straight cath per order and got 650 mL out.

## 2021-02-13 NOTE — FACE TO FACE
"Face to Face Note  -  Durable Medical Equipment    LEIA Dc. - NPI: [id number]  I certify that this patient is under my care and that they had a durable medical equipment(DME)face to face encounter by myself that meets the physician DME face-to-face encounter requirements with this patient on:    Date of encounter:   Patient:                    MRN:                       YOB: 2023  Hilda Emanuel  [id number]  1955     The encounter with the patient was in whole, or in part, for the following medical condition, which is the primary reason for durable medical equipment:  Other - 02 stats    I certify that, based on my findings, the following durable medical equipment is medically necessary:  Oxygen.    HOME O2 Saturation Measurements:(Values must be present for Home Oxygen orders)  Room air sat at rest: 85  Room air sat with amb: 73  With liters of O2: .5, O2 sat at rest with O2: 93  With Liters of O2: .5, O2 sat with amb with O2 : 88  Is the patient mobile?: Yes    My Clinical findings support the need for the above equipment due to:  Hypoxia    Supporting Symptoms: The patient requires supplemental oxygen, as the following interventions have been tried with limited or no improvement: \"Ambulation with oximetry and \"Incentive spirometry    If patient feels more short of breath, they can go up to 6 liters per minute and contact healthcare provider.  "

## 2021-02-13 NOTE — DISCHARGE SUMMARY
Discharge Summary    CHIEF COMPLAINT ON ADMISSION  Renal Mass    Reason for Admission  RENAL MASS     Admission Date  2/11/2021    CODE STATUS  Full Code    HPI & HOSPITAL COURSE  This is a 65 y.o. female here with right renal mass that is now s/p right robot assisted partial nephrectomy 02/11/2021.  She has done well following the procedure. She reports some pain but well controlled. Tolerating clear diet. Denies nausea, vomiting. + flatus, no BM.  Denies fevers, chills, nausea vomiting.     Patient was to discharge yesterday, but stayed overnight due to some difficulty with voiding.  Today she has been voiding better. Most recently voided 400cc, with only a residual of 231cc.  She did require oxygen and a face to face DME has been done for discharge.    Therefore, she is discharged in good and stable condition to home with close outpatient follow-up.    The patient recovered much more quickly than anticipated on admission.       PE:  Gen: NAD  Abd: Incision sites with dressing and dermabond C/D/I. Shiva with serosanguinous output.  : garcia with clear yellow output  Psyche: Mood, judgement, affect normal.    Discharge Date  02/12    FOLLOW UP ITEMS POST DISCHARGE  Post op with [facility]    DISCHARGE DIAGNOSES  Active Problems:    Renal mass, right POA: Unknown  Resolved Problems:    * No resolved hospital problems. *      FOLLOW UP  Post op with [facility] [place]    MEDICATIONS ON DISCHARGE     Medication List      START taking these medications      Instructions   oxyCODONE immediate-release 5 MG Tabs  Commonly known as: ROXICODONE   Take 1 tablet by mouth every 8 hours as needed for up to 3 days.  Dose: 5 mg        CONTINUE taking these medications      Instructions   acetaminophen 500 MG Tabs  Commonly known as: TYLENOL   Take 500-1,000 mg by mouth every 6 hours as needed.  Dose: 500-1,000 mg     Calcium 600 + D 600-400 MG-UNIT Tabs  Generic drug: Calcium Carbonate-Vitamin D   Take 1 Tab by mouth every  day.  Dose: 1 tablet     lansoprazole 15 MG Cpdr  Commonly known as: PREVACID   Take 1 capsule by mouth every morning.  Dose: 1 capsule     levothyroxine 125 MCG Tabs  Commonly known as: SYNTHROID   Take 125 mcg by mouth Every morning on an empty stomach. Brand Name Only  Dose: 125 mcg     multivitamin Tabs   Take 1 Tab by mouth every day. Also takes : collagen Peptides, 1 scoop qday  Dose: 1 tablet     SUPER B COMPLEX PO   Take 1 Tab by mouth every day.  Dose: 1 tablet     VITAMIN C ER PO   Take 1 Tab by mouth every day.  Dose: 1 tablet     Vitamin D3 2000 UNIT Caps   Take 2 Caps by mouth every day.  Dose: 2 capsule            Allergies  Allergies   Allergen Reactions   • Allopurinol Rash     .   • Septra [Bactrim] Rash and Vomiting     .   • Spironolactone Rash     .   • Sulfa Drugs Rash     NAUSEA AND VOMITING       DIET  Orders Placed This Encounter   Procedures   • Diet Order Diet: Clear Liquid     Standing Status:   Standing     Number of Occurrences:   1     Order Specific Question:   Diet:     Answer:   Clear Liquid [10]       ACTIVITY  Light duty.  10-lb lifting restriction    CONSULTATIONS  None    PROCEDURES  s/p right robot assisted partial nephrectomy 02/11/202    LABORATORY  Lab Results   Component Value Date    SODIUM 137 02/12/2021    POTASSIUM 4.7 02/12/2021    CHLORIDE 106 02/12/2021    CO2 21 02/12/2021    GLUCOSE 105 (H) 02/12/2021    BUN 27 (H) 02/12/2021    CREATININE 1.83 (H) 02/12/2021        Lab Results   Component Value Date    WBC 14.1 (H) 02/12/2021    HEMOGLOBIN 10.5 (L) 02/12/2021    HEMATOCRIT 32.0 (L) 02/12/2021    PLATELETCT 148 (L) 02/12/2021        Total time of the discharge process exceeds 35 minutes.

## 2021-02-13 NOTE — DISCHARGE PLANNING
Agency[facility]ility [facility]  Spoke To: Vi  Outcome: Medicare does not cover o2 for stated diagnosis in order    @1509  Received Choice form at 1506  Agency/Facility Name: [facility]  Referral sent per Choice form @ 1509     @1545  Agency/Facility Name: [facility]  Spoke To: Julissa  Outcome: Referral is being processed. Julissa stated that order has to be for 1L of 02 or greater.  RNCM Nancy Brantley notified     @3847  Agency/Facility Name: [facility]  Spoke To: After hours call center  Outcome: After hours stated the on-call  should f/u with case management soon and that the order should be completed and delivered by today    @5043  Agency/Facility Name: [facility]  Spoke To: Olvin  Outcome: Olvin stated he would request the on-call  to f/u with this DPA or VALW Mikaela    @5488  Agency/Facility Name: [facility]  Spoke To: Jorge  Outcome: On-call  Jorge stated that he set up o2 at home address and gave portable tank to .  is to bring tank to pt for d/c

## 2021-02-13 NOTE — PROGRESS NOTES
Bedside report received.  Assessment complete.  A&O x 4. Patient calls appropriately.  Patient mobilizes with standby assist. Bed alarm on.   Patient has 4/10 pain. Medicated per mar.  Denies N&V. Tolerating clear liquid diet.  6 laps sites to abdomen, clean, dry, intact, OSCAR.  + void, + flatus, + BM.  Patient denies SOB.  SCD's on.  Review plan with of care with patient. Call light and personal belongings with in reach. Hourly rounding in place. All needs met at this time.

## 2021-02-13 NOTE — DISCHARGE PLANNING
Anticipated Discharge Disposition: Home with new home oxygen set up    Action:   · Completed chart review and discussed pt's POC with MARIEL Moyer RN.   · RN ALPESH obtained [facility] Faxed choice to VICKIE Valera.     Barriers to Discharge:   · Pending home oxygen service provider acceptance and delivery of transport tank    Plan: Continue to collaborate with the pt, pt's family, and health care team to provide social and discharge support as needed.      Addendum:    1506: Per [place], pt declined by [facility] because the pt's does not have a respiratory diagnosis. RN CM spoke to the pt at bedside and obtained choice for [facility], informed the pt this O2 service is out of pocket cost of $120 per month. Faxed choice to VICKIE Valera. Updated MARIEL Russell RN, verbally.

## 2021-02-14 NOTE — DISCHARGE INSTRUCTIONS
Discharge Instructions    Discharged to home by car with relative. Discharged via wheelchair, hospital escort: Yes.  Special equipment needed: Not Applicable    Be sure to schedule a follow-up appointment with your primary care doctor or any specialists as instructed.     Discharge Plan:   Influenza Vaccine Indication: Not indicated: Previously immunized this influenza season and > 8 years of age    I understand that a diet low in cholesterol, fat, and sodium is recommended for good health. Unless I have been given specific instructions below for another diet, I accept this instruction as my diet prescription.   Other diet: clear liquids    Special Instructions: None    · Is patient discharged on Warfarin / Coumadin?   No     Depression / Suicide Risk    As you are discharged from this [facility] facility, it is important to learn how to keep safe from harming yourself.    Recognize the warning signs:  · Abrupt changes in personality, positive or negative- including increase in energy   · Giving away possessions  · Change in eating patterns- significant weight changes-  positive or negative  · Change in sleeping patterns- unable to sleep or sleeping all the time   · Unwillingness or inability to communicate  · Depression  · Unusual sadness, discouragement and loneliness  · Talk of wanting to die  · Neglect of personal appearance   · Rebelliousness- reckless behavior  · Withdrawal from people/activities they love  · Confusion- inability to concentrate     If you or a loved one observes any of these behaviors or has concerns about self-harm, here's what you can do:  · Talk about it- your feelings and reasons for harming yourself  · Remove any means that you might use to hurt yourself (examples: pills, rope, extension cords, firearm)  · Get professional help from the community (Mental Health, Substance Abuse, psychological counseling)  · Do not be alone:Call your Safe Contact- someone whom you trust who will be  there for you.  · Call your local CRISIS HOTLINE 301-8672 or 562-508-5210  · Call your local Children's Mobile Crisis Response Team [place] (612) 771-0329 or www.[employer]  · Call the toll free National Suicide Prevention Hotlines   · National Suicide Prevention Lifeline 816-219-SLSH (4881)  · [facility] Line Network 800-SUICIDE (428-7554)      Home Oxygen Use, Adult  When a medical condition keeps you from getting enough oxygen, your health care provider may instruct you to take extra oxygen at home. Your health care provider will let you know:  · When to take oxygen.  · For how long to take oxygen.  · How quickly oxygen should be delivered (flow rate), in liters per minute (LPM or L/M).  Home oxygen can be given through:  · A mask.  · A nasal cannula. This is a device or tube that goes in the nostrils.  · A transtracheal catheter. This is a small, flexible tube placed in the trachea.  · A tracheostomy. This is a surgically made opening in the trachea.  These devices are connected with tubing to an oxygen source, such as:  · A tank. Tanks hold oxygen in gas form. They must be replaced when the oxygen is used up.  · A liquid oxygen device. This holds oxygen in liquid form. It must be replaced when the oxygen is used up.  · An oxygen concentrator machine. This filters oxygen in the room. It uses electricity, so you must have a backup cylinder of oxygen in case the power goes out.  Supplies needed:  To use oxygen, you will need:  · A mask, nasal cannula, transtracheal catheter, or tracheostomy.  · An oxygen tank, a liquid oxygen device, or an oxygen concentrator.  · The tape that your health care provider recommends (optional).  If you use a transtracheal catheter and your prescribed flow rate is 1 LPM or greater, you will also need a humidifier.  Risks and complications  · Fire. This can happen if the oxygen is exposed to a heat source, flame, or spark.  · Injury to skin. This can happen if liquid  oxygen touches your skin.  · Organ damage. This can happen if you get too little oxygen.  How to use oxygen  Your health care provider or a representative from your medical device company will show you how to use your oxygen device. Follow her or his instructions. The instructions may look something like this:  1. Wash your hands.  2. If you use an oxygen concentrator, make sure it is plugged in.  3. Place one end of the tube into the port on the tank, device, or machine.  4. Place the mask over your nose and mouth. Or, place the nasal cannula and secure it with tape if instructed. If you use a tracheostomy or transtracheal catheter, connect it to the oxygen source as directed.  5. Make sure the liter-flow setting on the machine is at the level prescribed by your health care provider.  6. Turn on the machine or adjust the knob on the tank or device to the correct liter-flow setting.  7. When you are done, turn off and unplug the machine, or turn the knob to OFF.  How to clean and care for the oxygen supplies  Nasal cannula  · Clean it with a warm, wet cloth daily or as needed.  · Wash it with a liquid soap once a week.  · Rinse it thoroughly once or twice a week.  · Replace it every 2-4 weeks.  · If you have an infection, such as a cold or pneumonia, change the cannula when you get better.  Mask  · Replace it every 2-4 weeks.  · If you have an infection, such as a cold or pneumonia, change the mask when you get better.  Humidifier bottle  · Wash the bottle between each refill:  ? Wash it with soap and warm water.  ? Rinse it thoroughly.  ? Disinfect it and its top.  ? Air-dry it.  · Make sure it is dry before you refill it.  Oxygen concentrator  · Clean the air filter at least twice a week according to directions from your home medical equipment and service company.  · Wipe down the cabinet every day. To do this:  ? Unplug the unit.  ? Wipe down the cabinet with a damp cloth.  ? Dry the cabinet.  Other  "equipment  · Change any extra tubing every 1-3 months.  · Follow instructions from your health care provider about taking care of any other equipment.  Safety tips  Fire safety tips    · Keep your oxygen and oxygen supplies at least 5 ft away from sources of heat, flames, and gonzalez at all times.  · Do not allow smoking near your oxygen. Put up \"no smoking\" signs in your home. Avoid smoking areas when in public.  · Do not use materials that can burn (are flammable) while you use oxygen.  · When you go to a restaurant with portable oxygen, ask to be seated in the nonsmoking section.  · Keep a fire extinguisher close by. Let your fire department know that you have oxygen in your home.  · Test your home smoke detectors regularly.  Traveling  · Secure your oxygen tank in the vehicle so that it does not move around. Follow instructions from your medical device company about how to safely secure your tank.  · Make sure you have enough oxygen for the amount of time you will be away from home.  · If you are planning air travel, contact the airline to find out if they allow the use of an approved portable oxygen concentrator. You may also need documents from your health care provider and medical device company before you travel.  General safety tips  · If you use an oxygen cylinder, make sure it is in a stand or secured to an object that will not move (fixed object).  · If you use liquid oxygen, make sure its container is kept upright.  · If you use an oxygen concentrator:  ? Tell your electric company. Make sure you are given priority service in the event that your power goes out.  ? Avoid using extension cords, if possible.  Follow these instructions at home:  · Use oxygen only as told by your health care provider.  · Do not use alcohol or other drugs that make you relax (sedating drugs) unless instructed. They can slow down your breathing rate and make it hard to get in enough oxygen.  · Know how and when to order a " refill of oxygen.  · Always keep a spare tank of oxygen. Plan ahead for holidays when you may not be able to get a prescription filled.  · Use water-based lubricants on your lips or nostrils. Do not use oil-based products like petroleum jelly.  · To prevent skin irritation on your cheeks or behind your ears, tuck some gauze under the tubing.  Contact a health care provider if:  · You get headaches often.  · You have shortness of breath.  · You have a lasting cough.  · You have anxiety.  · You are sleepy all the time.  · You develop an illness that affects your breathing.  · You cannot exercise at your regular level.  · You are restless.  · You have difficult or irregular breathing, and it is getting worse.  · You have a fever.  · You have persistent redness under your nose.  Get help right away if:  · You are confused.  · You have blue lips or fingernails.  · You are struggling to breathe.  Summary  · Your health care provider or a representative from your medical device company will show you how to use your oxygen device. Follow her or his instructions.  · If you use an oxygen concentrator, make sure it is plugged in.  · Make sure the liter-flow setting on the machine is at the level prescribed by your health care provider.  · Keep your oxygen and oxygen supplies at least 5 ft away from sources of heat, flames, and gonzalez at all times.  This information is not intended to replace advice given to you by your health care provider. Make sure you discuss any questions you have with your health care provider.  Document Released: 03/09/2005 Document Revised: 06/06/2019 Document Reviewed: 07/11/2017  Elsevier Patient Education © 2020 [employer]

## 2021-03-03 DIAGNOSIS — Z23 NEED FOR VACCINATION: ICD-10-CM

## 2021-09-30 ENCOUNTER — HOSPITAL ENCOUNTER (OUTPATIENT)
Dept: LAB | Facility: MEDICAL CENTER | Age: 66
End: 2021-09-30
Attending: OBSTETRICS & GYNECOLOGY
Payer: MEDICARE

## 2021-09-30 PROCEDURE — 88175 CYTOPATH C/V AUTO FLUID REDO: CPT

## 2021-10-01 LAB — CYTOLOGY REG CYTOL: NORMAL

## 2022-10-07 ENCOUNTER — HOSPITAL ENCOUNTER (OUTPATIENT)
Facility: MEDICAL CENTER | Age: 67
End: 2022-10-07
Attending: OBSTETRICS & GYNECOLOGY
Payer: MEDICARE

## 2022-10-07 ENCOUNTER — HOSPITAL ENCOUNTER (OUTPATIENT)
Dept: LAB | Facility: MEDICAL CENTER | Age: 67
End: 2022-10-07
Attending: OBSTETRICS & GYNECOLOGY

## 2022-10-07 PROCEDURE — 88175 CYTOPATH C/V AUTO FLUID REDO: CPT

## 2022-10-08 LAB — CYTOLOGY REG CYTOL: NORMAL

## 2022-11-03 ENCOUNTER — PATIENT MESSAGE (OUTPATIENT)
Dept: HEALTH INFORMATION MANAGEMENT | Facility: OTHER | Age: 67
End: 2022-11-03

## 2023-11-07 ENCOUNTER — HOSPITAL ENCOUNTER (OUTPATIENT)
Dept: LAB | Facility: MEDICAL CENTER | Age: 68
End: 2023-11-07
Attending: OBSTETRICS & GYNECOLOGY
Payer: MEDICARE

## 2023-11-07 PROCEDURE — 88175 CYTOPATH C/V AUTO FLUID REDO: CPT

## 2023-11-13 LAB
COMMENT NL11729A: NORMAL
CYTOLOGIST CVX/VAG CYTO: NORMAL
CYTOLOGY CVX/VAG DOC CYTO: NORMAL
CYTOLOGY CVX/VAG DOC THIN PREP: NORMAL
NOTE NL11727A: NORMAL
OTHER STN SPEC: NORMAL
QC REVIEWED BY NL11722A: NORMAL
STAT OF ADQ CVX/VAG CYTO-IMP: NORMAL

## 2024-12-05 ENCOUNTER — HOSPITAL ENCOUNTER (OUTPATIENT)
Facility: MEDICAL CENTER | Age: 69
End: 2024-12-05
Attending: OBSTETRICS & GYNECOLOGY
Payer: MEDICARE

## 2024-12-05 PROCEDURE — 87624 HPV HI-RISK TYP POOLED RSLT: CPT

## 2024-12-05 PROCEDURE — 88142 CYTOPATH C/V THIN LAYER: CPT

## 2024-12-16 LAB — THINPREP PAP, CYTOLOGY NL11781: NORMAL

## 2024-12-18 LAB
HPV I/H RISK 1 DNA SPEC QL NAA+PROBE: NOT DETECTED
SPECIMEN SOURCE: NORMAL

## (undated) DEVICE — SET LEADWIRE 5 LEAD BEDSIDE DISPOSABLE ECG (1SET OF 5/EA)

## (undated) DEVICE — SET SUCTION/IRRIGATION WITH DISPOSABLE TIP (6/CA )PART #0250-070-520 IS A SUB

## (undated) DEVICE — TRAY CATHETER FOLEY URINE METER W/STATLOCK 350ML (10EA/CA)

## (undated) DEVICE — SCISSORS 5MM CVD (6EA/BX)

## (undated) DEVICE — AIRLESS LAP SPRAY TIP VISTASEAL (3EA/BX)

## (undated) DEVICE — DRAPE ARM  BOX OF 20

## (undated) DEVICE — LACTATED RINGERS INJ 1000 ML - (14EA/CA 60CA/PF)

## (undated) DEVICE — NEEDLE DRIVER LARGE DA VINCI 10X'S REUSABLE

## (undated) DEVICE — KIT ANESTHESIA W/CIRCUIT & 3/LT BAG W/FILTER (20EA/CA)

## (undated) DEVICE — TUBING CLEARLINK DUO-VENT - C-FLO (48EA/CA)

## (undated) DEVICE — NEPTUNE 4 PORT MANIFOLD - (20/PK)

## (undated) DEVICE — BLANKET WARMING LOWER BODY - (10/CA) INACTIVE USE #8585

## (undated) DEVICE — SENSOR SPO2 NEO LNCS ADHESIVE (20/BX) SEE USER NOTES

## (undated) DEVICE — SET EXTENSION WITH 2 PORTS (48EA/CA) ***PART #2C8610 IS A SUBSTITUTE*****

## (undated) DEVICE — APPLICATOR COTTONTIP 3 IN - STERILE (10EA/PK 100PK/CA)

## (undated) DEVICE — RESERVOIR SUCTION 100 CC - SILICONE (20EA/CA)

## (undated) DEVICE — CLEANER ELECTRO-SURGICAL TIP - (25/BX 4BX/CA)

## (undated) DEVICE — SUTURE 3-0 VICRYL PLUS SH - 27 INCH (36/BX)

## (undated) DEVICE — TIP APPLICATOR EVICEL SPRAYER (3/BX)

## (undated) DEVICE — CHLORAPREP 26 ML APPLICATOR - ORANGE TINT(25/CA)

## (undated) DEVICE — DEVICE CLOSURE KIT VISTASEAL 4ML (1EA/BX)

## (undated) DEVICE — TROCAR 5X100 NON BLADED Z-TH - READ KII (6/BX)

## (undated) DEVICE — OBTURATOR BLADELESS STANDARD 8MM (6EA/BX)

## (undated) DEVICE — TROCAR Z THREAD12MM OPTICAL - NON BLADED (6/BX)

## (undated) DEVICE — CATHETER IV 20 GA X 1-1/4 ---SURG.& SDS ONLY--- (50EA/BX)

## (undated) DEVICE — ANTI-FOG SOLUTION - 60BTL/CA

## (undated) DEVICE — DERMABOND ADVANCED - (12EA/BX)

## (undated) DEVICE — SET TUBING PNEUMOCLEAR HIGH FLOW SMOKE EVACUATION (10EA/BX)

## (undated) DEVICE — SURGIFOAM (12X7) - (12EA/CA)

## (undated) DEVICE — ELECTRODE 850 FOAM ADHESIVE - HYDROGEL RADIOTRNSPRNT (50/PK)

## (undated) DEVICE — TOWELS CLOTH SURGICAL - (4/PK 20PK/CA)

## (undated) DEVICE — SUTURE GENERAL

## (undated) DEVICE — SYSTEM CLEARIFY VISUALIZATION (10EA/PK)

## (undated) DEVICE — SPONGE DRAIN 4 X 4IN 6-PLY - (2/PK25PK/BX12BX/CS)

## (undated) DEVICE — NEEDLE INSFL 120MM 14GA VRRS - (20/BX)

## (undated) DEVICE — BAG RETRIEVAL 10ML (10EA/BX)

## (undated) DEVICE — SUTURE 3-0 VICRYL PLUS RB-1 - (36/BX)

## (undated) DEVICE — CANNULA W/SEAL12X100ZTHREAD - (12/BX)

## (undated) DEVICE — SUTURE 3-0 MONOCRYL SH (36PK/BX)

## (undated) DEVICE — CLIP LAPRA-TY ABSORB SUTURE - (6/BX)

## (undated) DEVICE — DRAPE COLUMN  BOX OF 20

## (undated) DEVICE — TUBE E-T HI-LO CUFF 7.0MM (10EA/PK)

## (undated) DEVICE — DRAPE STRLE REG TOWEL 18X24 - (10/BX 4BX/CA)"

## (undated) DEVICE — SODIUM CHL IRRIGATION 0.9% 1000ML (12EA/CA)

## (undated) DEVICE — SET C-LINE INTUBATION (5EA/SP)

## (undated) DEVICE — SLEEVE, VASO, THIGH, MED

## (undated) DEVICE — CANISTER SUCTION RIGID RED 1500CC (40EA/CA)

## (undated) DEVICE — SUTURE 4-0 MONOCRYL PLUS PS-1 - 27 INCH (36/BX)

## (undated) DEVICE — CANISTER SUCTION 3000ML MECHANICAL FILTER AUTO SHUTOFF MEDI-VAC NONSTERILE LF DISP  (40EA/CA)

## (undated) DEVICE — SHEARS MONOPOLAR CURVED  DA VINCI 10X'S REUSABLE

## (undated) DEVICE — SUTURE 7-0 VICRYL TG140-8 (12PK/BX)

## (undated) DEVICE — FORCEPS PROGRASP DA VINCI 10X'S REUSABLE

## (undated) DEVICE — TUBE CONNECTING SUCTION - CLEAR PLASTIC STERILE 72 IN (50EA/CA)

## (undated) DEVICE — CLIP HEMOLOCK PURPLE - (14/BX)

## (undated) DEVICE — PATTIES SURG X-RAYCOTTONOID - 1/2 X 3 IN (200/CA)

## (undated) DEVICE — GOWN SURGEONS X-LARGE - DISP. (30/CA)

## (undated) DEVICE — PROTECTOR ULNA NERVE - (36PR/CA)

## (undated) DEVICE — GLOVES, #7 1/2 BIOGEL M

## (undated) DEVICE — Device

## (undated) DEVICE — ROBOTIC SURGERY SERVICES

## (undated) DEVICE — MASK ANESTHESIA ADULT  - (100/CA)

## (undated) DEVICE — KIT  I.V. START (100EA/CA)

## (undated) DEVICE — COVER TIP ENDOWRIST HOT SHEAR - (10EA/BX) DA VINCI

## (undated) DEVICE — GLOVE BIOGEL PI INDICATOR SZ 7.5 SURGICAL PF LF -(50/BX 4BX/CA)

## (undated) DEVICE — FORCEPS FENESTRATED BIPOLAR DA VINCI 10X'S REUSABLE

## (undated) DEVICE — DRAPE LARGE 3 QUARTER - (20/CA)

## (undated) DEVICE — ELECTRODE DUAL RETURN W/ CORD - (50/PK)

## (undated) DEVICE — SUTURE 6-0 PROLENE P-3 - (12/BX)

## (undated) DEVICE — WATER IRRIGATION STERILE 1000ML (12EA/CA)

## (undated) DEVICE — DRAIN JACKSON PRATT 15FR - (10EA/CA)

## (undated) DEVICE — BLADE 60 DEGREE ANGLED SMOOTH (3EA/SP)

## (undated) DEVICE — SUTURE 2-0 ETHILON FS - (36/BX) 18 INCH

## (undated) DEVICE — GLOVE BIOGEL SZ 7.5 SURGICAL PF LTX - (50PR/BX 4BX/CA)

## (undated) DEVICE — HEAD HOLDER JUNIOR/ADULT

## (undated) DEVICE — SUCTION INSTRUMENT YANKAUER BULBOUS TIP W/O VENT (50EA/CA)

## (undated) DEVICE — TUBE CONNECT SUCTION CLEAR 120 X 1/4" (50EA/CA)"

## (undated) DEVICE — SHIELD OPTH AL GRTR CVR FOX (50EA/BX)

## (undated) DEVICE — GLOVE BIOGEL SZ 7 SURGICAL PF LTX - (50PR/BX 4BX/CA)

## (undated) DEVICE — SUTURE 0 VICRYL PLUS CT-1 - 36 INCH (36/BX)

## (undated) DEVICE — GOWN WARMING STANDARD FLEX - (30/CA)

## (undated) DEVICE — CLIP HEM-O-LOC GREEN - (14EA/BX)

## (undated) DEVICE — SEAL 5MM-8MM UNIVERSAL  BOX OF 10